# Patient Record
Sex: MALE | Race: WHITE
[De-identification: names, ages, dates, MRNs, and addresses within clinical notes are randomized per-mention and may not be internally consistent; named-entity substitution may affect disease eponyms.]

---

## 2022-08-18 ENCOUNTER — HOSPITAL ENCOUNTER (INPATIENT)
Dept: HOSPITAL 26 - MED | Age: 62
LOS: 29 days | Discharge: HOME HEALTH | DRG: 721 | End: 2022-09-16
Attending: STUDENT IN AN ORGANIZED HEALTH CARE EDUCATION/TRAINING PROGRAM | Admitting: STUDENT IN AN ORGANIZED HEALTH CARE EDUCATION/TRAINING PROGRAM
Payer: MEDICAID

## 2022-08-18 VITALS — SYSTOLIC BLOOD PRESSURE: 118 MMHG | DIASTOLIC BLOOD PRESSURE: 68 MMHG

## 2022-08-18 VITALS — SYSTOLIC BLOOD PRESSURE: 117 MMHG | DIASTOLIC BLOOD PRESSURE: 53 MMHG

## 2022-08-18 VITALS — SYSTOLIC BLOOD PRESSURE: 117 MMHG | DIASTOLIC BLOOD PRESSURE: 55 MMHG

## 2022-08-18 VITALS — SYSTOLIC BLOOD PRESSURE: 107 MMHG | DIASTOLIC BLOOD PRESSURE: 50 MMHG

## 2022-08-18 VITALS — WEIGHT: 135 LBS | BODY MASS INDEX: 18.28 KG/M2 | HEIGHT: 72 IN

## 2022-08-18 VITALS — DIASTOLIC BLOOD PRESSURE: 53 MMHG | SYSTOLIC BLOOD PRESSURE: 117 MMHG

## 2022-08-18 VITALS — SYSTOLIC BLOOD PRESSURE: 118 MMHG | DIASTOLIC BLOOD PRESSURE: 55 MMHG

## 2022-08-18 VITALS — DIASTOLIC BLOOD PRESSURE: 57 MMHG | SYSTOLIC BLOOD PRESSURE: 121 MMHG

## 2022-08-18 VITALS — SYSTOLIC BLOOD PRESSURE: 112 MMHG | DIASTOLIC BLOOD PRESSURE: 57 MMHG

## 2022-08-18 VITALS — SYSTOLIC BLOOD PRESSURE: 117 MMHG | DIASTOLIC BLOOD PRESSURE: 66 MMHG

## 2022-08-18 DIAGNOSIS — I50.9: ICD-10-CM

## 2022-08-18 DIAGNOSIS — H54.8: ICD-10-CM

## 2022-08-18 DIAGNOSIS — L89.629: ICD-10-CM

## 2022-08-18 DIAGNOSIS — N18.6: ICD-10-CM

## 2022-08-18 DIAGNOSIS — E43: ICD-10-CM

## 2022-08-18 DIAGNOSIS — I46.9: ICD-10-CM

## 2022-08-18 DIAGNOSIS — Z99.2: ICD-10-CM

## 2022-08-18 DIAGNOSIS — A41.81: ICD-10-CM
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
## 2022-08-18 DIAGNOSIS — Z20.822: ICD-10-CM

## 2022-08-18 DIAGNOSIS — J69.0: ICD-10-CM

## 2022-08-18 DIAGNOSIS — T80.211A: Primary | ICD-10-CM

## 2022-08-18 DIAGNOSIS — L97.422: ICD-10-CM

## 2022-08-18 DIAGNOSIS — E11.621: ICD-10-CM

## 2022-08-18 DIAGNOSIS — J96.01: ICD-10-CM
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
## 2022-08-18 DIAGNOSIS — E11.22: ICD-10-CM

## 2022-08-18 DIAGNOSIS — R65.20: ICD-10-CM

## 2022-08-18 DIAGNOSIS — I13.2: ICD-10-CM

## 2022-08-18 DIAGNOSIS — J91.8: ICD-10-CM

## 2022-08-18 DIAGNOSIS — D63.1: ICD-10-CM

## 2022-08-18 LAB
ALBUMIN FLD-MCNC: 2.2 G/DL (ref 3.4–5)
AMYLASE SERPL-CCNC: 14 U/L (ref 25–115)
ANION GAP SERPL CALCULATED.3IONS-SCNC: 18.7 MMOL/L (ref 8–16)
APAP SERPL-MCNC: < 0.5 UG/ML (ref 10–30)
APPEARANCE UR: CLEAR
AST SERPL-CCNC: 38 U/L (ref 15–37)
BASOPHILS # BLD AUTO: 0.1 K/UL (ref 0–0.22)
BASOPHILS NFR BLD AUTO: 0.2 % (ref 0–2)
BILIRUB SERPL-MCNC: 3.3 MG/DL (ref 0–1)
BILIRUB UR QL STRIP: (no result)
BUN SERPL-MCNC: 43 MG/DL (ref 7–18)
CHLORIDE SERPL-SCNC: 97 MMOL/L (ref 98–107)
CO2 SERPL-SCNC: 25.7 MMOL/L (ref 21–32)
COLOR UR: (no result)
CREAT SERPL-MCNC: 4.7 MG/DL (ref 0.6–1.3)
EOSINOPHIL # BLD AUTO: 0.1 K/UL (ref 0–0.4)
EOSINOPHIL NFR BLD AUTO: 0.3 % (ref 0–4)
ERYTHROCYTE [DISTWIDTH] IN BLOOD BY AUTOMATED COUNT: 16 % (ref 11.6–13.7)
GFR SERPL CREATININE-BSD FRML MDRD: 16 ML/MIN (ref 90–?)
GLUCOSE SERPL-MCNC: 177 MG/DL (ref 74–106)
GLUCOSE UR STRIP-MCNC: (no result) MG/DL
HCT VFR BLD AUTO: 27 % (ref 36–52)
HGB BLD-MCNC: 9.1 G/DL (ref 12–18)
HGB UR QL STRIP: (no result)
LEUKOCYTE ESTERASE UR QL STRIP: (no result)
LIPASE SERPL-CCNC: 22 U/L (ref 73–393)
LYMPHOCYTES # BLD AUTO: 1 K/UL (ref 2–11.5)
LYMPHOCYTES NFR BLD AUTO: 3.8 % (ref 20.5–51.1)
MAGNESIUM SERPL-MCNC: 2.3 MG/DL (ref 1.8–2.4)
MAGNESIUM SERPL-MCNC: 2.4 MG/DL (ref 1.8–2.4)
MCH RBC QN AUTO: 30 PG (ref 27–31)
MCHC RBC AUTO-ENTMCNC: 34 G/DL (ref 33–37)
MCV RBC AUTO: 89.3 FL (ref 80–94)
MONOCYTES # BLD AUTO: 1.5 K/UL (ref 0.8–1)
MONOCYTES NFR BLD AUTO: 5.7 % (ref 1.7–9.3)
NEUTROPHILS # BLD AUTO: 23.3 K/UL (ref 1.8–7.7)
NEUTROPHILS NFR BLD AUTO: 90 % (ref 42.2–75.2)
NITRITE UR QL STRIP: POSITIVE
PH UR STRIP: 5 [PH] (ref 5–9)
PHOSPHATE SERPL-MCNC: 3.6 MG/DL (ref 2.5–4.9)
PHOSPHATE SERPL-MCNC: 3.6 MG/DL (ref 2.5–4.9)
PLATELET # BLD AUTO: 232 K/UL (ref 140–450)
POTASSIUM SERPL-SCNC: 4.4 MMOL/L (ref 3.5–5.1)
PROTHROMBIN TIME: 12.4 SECS (ref 10.8–13.4)
RBC # BLD AUTO: 3.03 MIL/UL (ref 4.2–6.1)
RBC #/AREA URNS HPF: (no result) /HPF (ref 0–5)
SALICYLATES SERPL-MCNC: < 2.8 MG/DL (ref 2.8–20)
SODIUM SERPL-SCNC: 137 MMOL/L (ref 136–145)
WBC # BLD AUTO: 25.9 K/UL (ref 4.8–10.8)
WBC,URINE: (no result) /HPF (ref 0–5)

## 2022-08-18 PROCEDURE — G0480 DRUG TEST DEF 1-7 CLASSES: HCPCS

## 2022-08-18 PROCEDURE — G0482 DRUG TEST DEF 15-21 CLASSES: HCPCS

## 2022-08-18 PROCEDURE — A4649 SURGICAL SUPPLIES: HCPCS

## 2022-08-18 PROCEDURE — C9113 INJ PANTOPRAZOLE SODIUM, VIA: HCPCS

## 2022-08-18 RX ADMIN — Medication SCH DEV: at 21:29

## 2022-08-18 RX ADMIN — IPRATROPIUM BROMIDE AND ALBUTEROL SULFATE PRN ML: .5; 3 SOLUTION RESPIRATORY (INHALATION) at 23:06

## 2022-08-18 RX ADMIN — IPRATROPIUM BROMIDE AND ALBUTEROL SULFATE SCH ML: .5; 3 SOLUTION RESPIRATORY (INHALATION) at 20:29

## 2022-08-18 NOTE — NUR
FAMILY AT BEDSIDE, ALL QUESTIONS ANSWERED

DTMADINA COTTO: [phone] (CELL)

WIFE: MICHELLE DIAS, [phone] (CELL)

## 2022-08-18 NOTE — NUR
Patient will be admitted to care of DR. SARKAR. Admited to ICU.  Will go to BED 
3. Belongings list completed.  Report to JUNO.

## 2022-08-18 NOTE — NUR
RECEIVED CALL FROM LAB; MD ORDERED COVID - PCR TEST.

DUE TO NEW COVID TEST BEING REQUESTED FOR PATIENT; HE WAS TRANSFERRED TO ICU-8 AND IS LISTED 
AS PUI UNTIL RESULTS COME BACK

PATIENT HAD NEGATIVE COVID (RAPID TEST) IN ER.

**

SWABBED PATIENT FOR UPDATED COVID, AND SPECIMEN WAS TAKEN TO THE LAB FOR PICK-UP AND 
TRANSPORT TO OUTSIDE LAB.

## 2022-08-18 NOTE — NUR
RECEIVED REPORT FROM ER NURSE (JAKOB).  PATIENT ARRIVED ON GURNEY, INTUBATED (ETT TO VENT) 
ON PROPOFOL DRIP AT 25MCG/MIN.  PATIENT ALSO WITH OG TUBE.  PATIENT HAS BARAJAS IN PLACE.

PATIENT WITH PICC LINE DYLAN, EDUARD CATH RIGHT UPPER CHEST WALL, AND PERIPHERAL IV, 18G, 
LEFT AC.  

HAS SACRAL PRESSURE ULCER, AND LEFT HEEL ULCER.  

CURRENT VENT SETTINGS:  AC/VC, FIO2 = 80%, VT = 500, R = 16, PEEP = 5.

## 2022-08-18 NOTE — NUR
61Y.O. M BIBA C/O SOB/AGANAL BREATHING. PT BEING BAGGED ON ARRIVAL 15L. GCS:3. 
PER FAMILY PT WAS TESTED FOR COVID; UNKNOWN RESULT. PT HAS DIALYSIS MWF. PT 
UNRESPONSIVE ON ARRIVAL. SKIN WAS COOL PALE AND STICKY. PICC R UPPER ARM. BS: 
186 PER EMS. A&OX0, SKIN HAS BRUISING AND PRESSURE INJURIES, VITALS STABLE AT 
THIS TIME, AND ON BED REST. 



NKA

HX: HTN, DM, RENAL FAILURE (ERSD)

## 2022-08-18 NOTE — NUR
(1540) ON OR ABOUT THIS TIME PATIENT INTUBATED BY DR. CLAIRE VALIENTE; USING A 
GLIDESCOPE AN ENDOTRACHEAL TUBE #8.0;  SUCCESSFUL INTUBATION; CONFIRMATION VIA 
CO2 DETECTOR (YELLOW); AUSCULTATION BY BLANCAD TO THE BILATERAL LUNG KAY APEX 
TO MID THE ABDOMINAL REGION; CHEST XRAY TO FOLLOW

## 2022-08-19 VITALS — DIASTOLIC BLOOD PRESSURE: 52 MMHG | SYSTOLIC BLOOD PRESSURE: 111 MMHG

## 2022-08-19 VITALS — SYSTOLIC BLOOD PRESSURE: 116 MMHG | DIASTOLIC BLOOD PRESSURE: 53 MMHG

## 2022-08-19 VITALS — DIASTOLIC BLOOD PRESSURE: 61 MMHG | SYSTOLIC BLOOD PRESSURE: 111 MMHG

## 2022-08-19 VITALS — SYSTOLIC BLOOD PRESSURE: 111 MMHG | DIASTOLIC BLOOD PRESSURE: 54 MMHG

## 2022-08-19 VITALS — SYSTOLIC BLOOD PRESSURE: 113 MMHG | DIASTOLIC BLOOD PRESSURE: 54 MMHG

## 2022-08-19 VITALS — DIASTOLIC BLOOD PRESSURE: 50 MMHG | SYSTOLIC BLOOD PRESSURE: 112 MMHG

## 2022-08-19 VITALS — SYSTOLIC BLOOD PRESSURE: 108 MMHG | DIASTOLIC BLOOD PRESSURE: 52 MMHG

## 2022-08-19 VITALS — DIASTOLIC BLOOD PRESSURE: 51 MMHG | SYSTOLIC BLOOD PRESSURE: 109 MMHG

## 2022-08-19 VITALS — SYSTOLIC BLOOD PRESSURE: 113 MMHG | DIASTOLIC BLOOD PRESSURE: 53 MMHG

## 2022-08-19 VITALS — SYSTOLIC BLOOD PRESSURE: 133 MMHG | DIASTOLIC BLOOD PRESSURE: 61 MMHG

## 2022-08-19 VITALS — SYSTOLIC BLOOD PRESSURE: 113 MMHG | DIASTOLIC BLOOD PRESSURE: 55 MMHG

## 2022-08-19 VITALS — DIASTOLIC BLOOD PRESSURE: 58 MMHG | SYSTOLIC BLOOD PRESSURE: 115 MMHG

## 2022-08-19 VITALS — SYSTOLIC BLOOD PRESSURE: 118 MMHG | DIASTOLIC BLOOD PRESSURE: 54 MMHG

## 2022-08-19 VITALS — SYSTOLIC BLOOD PRESSURE: 111 MMHG | DIASTOLIC BLOOD PRESSURE: 52 MMHG

## 2022-08-19 VITALS — SYSTOLIC BLOOD PRESSURE: 95 MMHG | DIASTOLIC BLOOD PRESSURE: 49 MMHG

## 2022-08-19 VITALS — DIASTOLIC BLOOD PRESSURE: 54 MMHG | SYSTOLIC BLOOD PRESSURE: 119 MMHG

## 2022-08-19 VITALS — SYSTOLIC BLOOD PRESSURE: 100 MMHG | DIASTOLIC BLOOD PRESSURE: 52 MMHG

## 2022-08-19 VITALS — SYSTOLIC BLOOD PRESSURE: 113 MMHG | DIASTOLIC BLOOD PRESSURE: 50 MMHG

## 2022-08-19 VITALS — DIASTOLIC BLOOD PRESSURE: 56 MMHG | SYSTOLIC BLOOD PRESSURE: 113 MMHG

## 2022-08-19 VITALS — DIASTOLIC BLOOD PRESSURE: 51 MMHG | SYSTOLIC BLOOD PRESSURE: 106 MMHG

## 2022-08-19 VITALS — DIASTOLIC BLOOD PRESSURE: 52 MMHG | SYSTOLIC BLOOD PRESSURE: 101 MMHG

## 2022-08-19 VITALS — DIASTOLIC BLOOD PRESSURE: 54 MMHG | SYSTOLIC BLOOD PRESSURE: 112 MMHG

## 2022-08-19 VITALS — SYSTOLIC BLOOD PRESSURE: 106 MMHG | DIASTOLIC BLOOD PRESSURE: 52 MMHG

## 2022-08-19 VITALS — DIASTOLIC BLOOD PRESSURE: 53 MMHG | SYSTOLIC BLOOD PRESSURE: 108 MMHG

## 2022-08-19 VITALS — SYSTOLIC BLOOD PRESSURE: 114 MMHG | DIASTOLIC BLOOD PRESSURE: 53 MMHG

## 2022-08-19 VITALS — DIASTOLIC BLOOD PRESSURE: 50 MMHG | SYSTOLIC BLOOD PRESSURE: 102 MMHG

## 2022-08-19 VITALS — DIASTOLIC BLOOD PRESSURE: 50 MMHG | SYSTOLIC BLOOD PRESSURE: 130 MMHG

## 2022-08-19 LAB
ANION GAP SERPL CALCULATED.3IONS-SCNC: 18.3 MMOL/L (ref 8–16)
BASOPHILS # BLD AUTO: 0.1 K/UL (ref 0–0.22)
BASOPHILS NFR BLD AUTO: 0.2 % (ref 0–2)
BUN SERPL-MCNC: 51 MG/DL (ref 7–18)
CHLORIDE SERPL-SCNC: 97 MMOL/L (ref 98–107)
CO2 SERPL-SCNC: 24.7 MMOL/L (ref 21–32)
CREAT SERPL-MCNC: 5.2 MG/DL (ref 0.6–1.3)
EOSINOPHIL # BLD AUTO: 0.1 K/UL (ref 0–0.4)
EOSINOPHIL NFR BLD AUTO: 0.4 % (ref 0–4)
ERYTHROCYTE [DISTWIDTH] IN BLOOD BY AUTOMATED COUNT: 16 % (ref 11.6–13.7)
GFR SERPL CREATININE-BSD FRML MDRD: 15 ML/MIN (ref 90–?)
GLUCOSE SERPL-MCNC: 158 MG/DL (ref 74–106)
HCT VFR BLD AUTO: 29.5 % (ref 36–52)
HGB BLD-MCNC: 9.8 G/DL (ref 12–18)
LYMPHOCYTES # BLD AUTO: 1.1 K/UL (ref 2–11.5)
LYMPHOCYTES NFR BLD AUTO: 3.2 % (ref 20.5–51.1)
MCH RBC QN AUTO: 30 PG (ref 27–31)
MCHC RBC AUTO-ENTMCNC: 33 G/DL (ref 33–37)
MCV RBC AUTO: 89.5 FL (ref 80–94)
MONOCYTES # BLD AUTO: 1.5 K/UL (ref 0.8–1)
MONOCYTES NFR BLD AUTO: 4.4 % (ref 1.7–9.3)
NEUTROPHILS # BLD AUTO: 30.9 K/UL (ref 1.8–7.7)
NEUTROPHILS NFR BLD AUTO: 91.8 % (ref 42.2–75.2)
PLATELET # BLD AUTO: 254 K/UL (ref 140–450)
POTASSIUM SERPL-SCNC: 4 MMOL/L (ref 3.5–5.1)
RBC # BLD AUTO: 3.3 MIL/UL (ref 4.2–6.1)
SODIUM SERPL-SCNC: 136 MMOL/L (ref 136–145)
WBC # BLD AUTO: 33.7 K/UL (ref 4.8–10.8)

## 2022-08-19 PROCEDURE — 5A1D70Z PERFORMANCE OF URINARY FILTRATION, INTERMITTENT, LESS THAN 6 HOURS PER DAY: ICD-10-PCS

## 2022-08-19 RX ADMIN — Medication SCH TAB: at 08:29

## 2022-08-19 RX ADMIN — DEXTROSE SCH MLS/HR: 50 INJECTION, SOLUTION INTRAVENOUS at 09:52

## 2022-08-19 RX ADMIN — IPRATROPIUM BROMIDE AND ALBUTEROL SULFATE SCH ML: .5; 3 SOLUTION RESPIRATORY (INHALATION) at 19:00

## 2022-08-19 RX ADMIN — Medication SCH GM: at 13:00

## 2022-08-19 RX ADMIN — Medication SCH DEV: at 12:03

## 2022-08-19 RX ADMIN — Medication SCH DEV: at 07:51

## 2022-08-19 RX ADMIN — Medication SCH DEV: at 21:12

## 2022-08-19 RX ADMIN — PANTOPRAZOLE SODIUM SCH MG: 40 TABLET, DELAYED RELEASE ORAL at 08:29

## 2022-08-19 RX ADMIN — IPRATROPIUM BROMIDE AND ALBUTEROL SULFATE SCH ML: .5; 3 SOLUTION RESPIRATORY (INHALATION) at 13:13

## 2022-08-19 RX ADMIN — Medication SCH DEV: at 07:30

## 2022-08-19 RX ADMIN — IPRATROPIUM BROMIDE AND ALBUTEROL SULFATE SCH ML: .5; 3 SOLUTION RESPIRATORY (INHALATION) at 07:21

## 2022-08-19 RX ADMIN — DEXTROSE SCH MLS/HR: 50 INJECTION, SOLUTION INTRAVENOUS at 02:24

## 2022-08-19 RX ADMIN — PROPOFOL PRN MLS/HR: 10 INJECTION, EMULSION INTRAVENOUS at 21:00

## 2022-08-19 RX ADMIN — Medication SCH DEV: at 16:47

## 2022-08-19 RX ADMIN — DEXTROSE SCH MLS/HR: 50 INJECTION, SOLUTION INTRAVENOUS at 17:55

## 2022-08-19 RX ADMIN — Medication SCH MG: at 08:30

## 2022-08-19 RX ADMIN — EPOETIN ALFA-EPBX SCH UNITS: 10000 INJECTION, SOLUTION INTRAVENOUS; SUBCUTANEOUS at 14:57

## 2022-08-19 NOTE — NUR
RECEIVED REPORT FROM BABITA. PT IN STATED CONDITION PT WAS INTUBATED IN THE ED . HE'S 
SEDATED PRESENTLY ON PROPOFOL @25MCG/KG.HE IS AT A RASS -4 PROPOFOL IS FINISHED.IT WAS A ONE 
TIME ORDER AND HAD TO BE REORDERED BY JAD LANE NURSE. HE HAS A DYLAN PICC LINE  AND A LAC 18 
GUAGE. HE HAS NS INFUSING AT A KVO RATE.  HE IS TOLERATING THE VENT WELL. HE IS IN SOFT 
WRIST RESTRAINTS. HE HAS A BARAJAS CATH IN PLACE WITH NO OUTPUT. HE HAD DIALYSIS ON 08/18/22 
AND CAME TO THE ED S/P DIALYSIS DUE TO AGONAL BREATHING. HE WAS ENTUBATED INN THE ED TO 
PROTECT HIS AIRWAY.

## 2022-08-19 NOTE — NUR
WOUND CARE EVALUATION NOTE:

SKIN ASSESSMENT DONE WITH THIS PT ADMITTED WITH INITIAL DX SOB.  PAST MEDICAL HX INCLUDES 
ESRD on HD (MWF), DM2 AND HTN. PT ADMITTED WITH PRESSURE INJURY STAGE 3 TO SACRAL AND 
UN-STAGEABLE TO LEFT HEEL. ALL ABOVE INFORMATION OBTAINED FROM ADMISSION H&P. PT IS 
INTUBATED, TF, SKIN IS WARM AND DRY, BLE NO HAIR GROWTH, +1 EDEMA.  DORSAL PEDAL PULSES 
PRESENT AND NORMAL. CAPILLARY REFILLED <2 SEC. X 10 TOES.  F/C PATENT WITH SMALL AMOUNT 
DANO COLOR URINE OUT PUT OBSERVED. PLAN OF CARE DISCUSSED WITH PRIMARY RN. POC DISCUSSED 
WITH DR. SARKAR AND REQUEST IN HOUSE PODIATRY CONSULT.



INTEGUMENTARY:

-INCONTINENT ASSOCIATE DERMATITIS (IAD) TO: B/L GROINS, INNER THIGH AND POSTERIOR THIGH TO 
SCROTAL, SKIN REDNESS, PEELING

-PRESSURE INJURY STAGE 3, SACROCOCCYX 5X2X0.3CM, BED IS % GRANULATING TISSUE, MOIST, 
NO ODOR, SERENA-WOUND SKIN MOIST, HEALING SCAR TISSUE, SURROUNDING NON-BLANCHABLE REDNESS 
INDICATED FURTHER DAMAGE

- PRESSURE INJURY UN-STAGEABLE, LEFT HEEL 5X4CM WOUND BED IS % BROWN/BLACK SLOUGH 
TISSUE, SMALL AMOUNT PURULRNT DRAINAGE, MILD ODOR, ENTIRE SERENA WOUND MUSHY, DENUDED SKIN 
WITH SURROUNDING DTI [id number] INDICATED FURTHER DAMAGE



RECOMMENDATIONS:

-WOUND CX TO SACRAL AND LEFT HEEL

-APPLY HEEL RAISERS AND OFFLOAD HEELS WITH PILLOWS

-LEFT HEEL APPLY MOIST BETADINE 5H6FWSHG, COVER WITH DRY DRESSING AND WRAP WITH KERLIX ROLLS 
DAILY AND PRN IF SOILING

-CLEANSE SACRAL WOUND WITH NS, PAT DRY, APPLY THERAHONEY GEL TO WOUND BED AND COVER WITH DRY 
DRESSING DAILY AND PRN IF SOILING.

-POSITIONING:

TURN AND REPOSITION PATIENT Q 2H OR SOONER

USE PILLOWS TO KEEP BONY PROMINENCES FROM DIRECT CONTACT WITH SURFACES

USE REPOSITIONING WEDGES TO PROVIDE 30-DEGREE ANGLE FOR SIDE LYING POSITIONS

OFFLOADING OR FOAM DRESSING TO ALL TUBING TO PREVENT MEDICAL DEVICES RELATED PRESSURE INJURY

-RE-EVALUATING AND MANAGING INCONTINENCE

MONITOR SKIN CONDITION DURING POSITION CHANGE

DO NOT MASSAGE REDNESS, BONY PROMINENCES

FREQUENT SERENA-CARE AND PROVIDE BARRIER CREAMS PRN IF SOILING

MOISTURE CONTROL BY OFFER BED PAN/URINAL /ABSORBENT PAD TO WICK AND HOLD MOISTURE

KEEP SKIN DRY AND PROTECT FROM FRICTION

-MANAGE FRICTION/SHEAR/MOBILITY

KEEP HOB AT THE LOWEST LEVEL OF ELEVATION NO MORE THAN 30 DEGREE UNLESS OTHERWISE 
CONTRAINDICATED

USE LIFT SHEET OR TRANSFER DEVICE TO MOVE PATIENT AND PREVENT LATERAL SHEER.

PROTECT HEELS, ELBOWS BONY PROMINENCES WITH SKIN BERRIES OR FOAM DRESSING IF EXPOSED TO 
FRICTION

OFFLOAD BILATERAL HEELS BY PLACING PILLOWS UNDER CALVES AT ALL TIMES, UNLESS OTHERWISE 
CONTRAINDICATED

-PRESSURE REDISTRIBUTION SURFACE THERAPY NOHELIA ISOFLEX MATTRESS

-NUTRITION:

PLEASE FOLLOW RD RECOMMENDATIONS AND OFFER NUTRITION SUPPLEMENTS IF ORDERED.

## 2022-08-19 NOTE — NUR
RECEIVED REPORT FROM TONYA NIGHT SHIFT RN, FOR CONTINUITY OF CARE. PT A&OX0, SEDATED. 
PUPILS SLUGGISH RESPONSE TO LIGHT. ETT TO VENT, ACVC FIO2 50%, , PEEP 5. SR ON 
MONITOR. PICC TO DYLAN INFUSING PROPOFOL AT 25 MCG/KG/MIN. 18G IV TO LAC, SALINE LOCK. OGT IN 
PLACE, CLAMPED. BOWEL SOUNDS ACTIVE. F/C TO GRAVITY, OLIGURIA. GENERALIZED WEAKNESS. SOFT 
WRIST RESTRAINTS TO BUE NO S/S OF INJURY. DROPLET PRECAUTION FOR COVID PUI. BED IN LOWEST 
POSITION, CALL LIGHT WITHIN REACH.

## 2022-08-19 NOTE — NUR
PATIENT HAS BEEN SCREENED AND CATEGORIZED AS HIGH NUTRITION RISK. PATIENT WILL BE SEEN 
WITHIN 1-2 DAYS OF ADMISSION.



8/19/228/20/22

REFERRAL RECEIVED FOR WOUNDS/PRESSURE INJURY AND MALNUTRITION



DAMIR HART RD

## 2022-08-19 NOTE — NUR
SEEN AND EXAMINED BY DR. MAURER FOR L FOOT ULCER. ORDERED TO CLEANSE WITH IODINE, WRAP WITH 
GAUZE, AND ELEVATE HEEL ON PILLOW TO OFFLOAD PRESSURE.

## 2022-08-19 NOTE — NUR
DAUGHTER MADINA VISIT OUTSIDE OF PT. RM. PT. WOUND HX OBTAINED FROM DAUGHTER, PER DAUGHTER 
LEFT HEEL HX OF DEBRIDEMENT AT 

[facility] BY DR. MARIXA BAEZA AND PT. HAS JUST FINISHED A COURSE OF IV 
ANTIBIOTIC RECENTLY, PER DAUGHTER SHE PROVIDES WOUND CARE AT HOME BUT NO SANTYLE OINTMENT 
AVAILABLE DUE TO PRICING AND LEFT HEEL /SACRAL WOUNDS DOES"T REALLY HEAL WELL. TREATMENT 
PLAN DISCUSSED AND AGREEABLE WITH BEATRIZ RAINEY. POC DISCUSSED WITH DR. SARKAR, ALL ABOVE 
INFORMATION DISCUSSED  AND RECOMMEND PODIATRY CONSULT. POC DISCUSSED WITH PRIMARY RN 
BABITA.

## 2022-08-19 NOTE — NUR
8/19/22 RD INITIAL ASSESSMENT COMPLETED



PLEASE REFER TO NUTRITION ASSESSMENT UNDER CARE ACTIVITY FOR ESTIMATED NUTRITIONAL NEEDS. 



1. WHEN/IF MEDICALLY APPROPRIATE TO INITIATE TF, RECOMMEND NEPRO CARBSTEADY WITH A GOAL RATE 
OF 50 ML/HR WITH PROSOURCE TID 

-FWF: 50 ML Q8H OR PER MD

-START AT 20 ML/HR AND INCREASE BY 10 ML Q4H AS TOLERATED  

-WITH PROSOURCE TID, PT WILL RECEIVE ~94% ESTIMATED KCAL % ESTIMATED PROTEIN NEEDS; 
ADEQUATE

2. IF EXTUBATED, RECOMMEND RENAL DIET WITH NEPRO BID 

3. RD TO FOLLOW-UP 2-3 DAYS, HIGH RISK 



DAMIR HART RD

## 2022-08-19 NOTE — NUR
RECEIVED CALL FROM DR. MERCADO; PROVIDED UPDATE ON PATIENT'S CURRENT STATUS; NO NEW ORDERS 
PROVIDED

## 2022-08-19 NOTE — NUR
Last visit 7/18. Next visit 2/19. Last thyroid labs 7/5/18.    ASSESSMENT:  1. Postsurgical hypothyroidism: Patient is on levothyroxine 224 mcg daily Monday through Saturday and 168 micrograms on Sunday. most recent labs are normal. Patient is on levothyroxine 112 micrograms tablets and takes 2 tablets Monday through Saturday and one and a half tablet on Sunday.    2. Other malaise and fatigue: Likely multifactorial.   3. S/P thyroidectomy: Pathology did not show any evidence of thyroid malignancy.    4.  Vitamin D deficiency likely nutritional in nature: Patient is not currently on vitamin D3 1000 IU daily.  5.  Perimenopausal: menstrual cycles are irregular.     PLAN:  1. I will check a TSH, free T4 and vitamin D in 6 months.  2. I will continue current levothyroxine dose with no change.         XRAY AT BEDSIDE FOR FOOT XRAY.

## 2022-08-19 NOTE — NUR
RECEIVED PT ON ACVC , RR16, +5, 50%. WHEELS LOCKED AND PLUGGED INTO RED OUTLET, ALARMS 
ARE SET AND AUDIBLE. PT RESTING COMFORTABLY.

## 2022-08-20 VITALS — SYSTOLIC BLOOD PRESSURE: 115 MMHG | DIASTOLIC BLOOD PRESSURE: 58 MMHG

## 2022-08-20 VITALS — SYSTOLIC BLOOD PRESSURE: 123 MMHG | DIASTOLIC BLOOD PRESSURE: 60 MMHG

## 2022-08-20 VITALS — DIASTOLIC BLOOD PRESSURE: 57 MMHG | SYSTOLIC BLOOD PRESSURE: 115 MMHG

## 2022-08-20 VITALS — DIASTOLIC BLOOD PRESSURE: 85 MMHG | SYSTOLIC BLOOD PRESSURE: 123 MMHG

## 2022-08-20 VITALS — SYSTOLIC BLOOD PRESSURE: 116 MMHG | DIASTOLIC BLOOD PRESSURE: 59 MMHG

## 2022-08-20 VITALS — DIASTOLIC BLOOD PRESSURE: 56 MMHG | SYSTOLIC BLOOD PRESSURE: 116 MMHG

## 2022-08-20 VITALS — SYSTOLIC BLOOD PRESSURE: 119 MMHG | DIASTOLIC BLOOD PRESSURE: 61 MMHG

## 2022-08-20 VITALS — DIASTOLIC BLOOD PRESSURE: 52 MMHG | SYSTOLIC BLOOD PRESSURE: 105 MMHG

## 2022-08-20 VITALS — SYSTOLIC BLOOD PRESSURE: 116 MMHG | DIASTOLIC BLOOD PRESSURE: 61 MMHG

## 2022-08-20 VITALS — SYSTOLIC BLOOD PRESSURE: 110 MMHG | DIASTOLIC BLOOD PRESSURE: 57 MMHG

## 2022-08-20 VITALS — SYSTOLIC BLOOD PRESSURE: 113 MMHG | DIASTOLIC BLOOD PRESSURE: 58 MMHG

## 2022-08-20 VITALS — SYSTOLIC BLOOD PRESSURE: 126 MMHG | DIASTOLIC BLOOD PRESSURE: 60 MMHG

## 2022-08-20 VITALS — DIASTOLIC BLOOD PRESSURE: 61 MMHG | SYSTOLIC BLOOD PRESSURE: 118 MMHG

## 2022-08-20 VITALS — SYSTOLIC BLOOD PRESSURE: 117 MMHG | DIASTOLIC BLOOD PRESSURE: 59 MMHG

## 2022-08-20 VITALS — DIASTOLIC BLOOD PRESSURE: 59 MMHG | SYSTOLIC BLOOD PRESSURE: 123 MMHG

## 2022-08-20 VITALS — SYSTOLIC BLOOD PRESSURE: 114 MMHG | DIASTOLIC BLOOD PRESSURE: 51 MMHG

## 2022-08-20 VITALS — DIASTOLIC BLOOD PRESSURE: 50 MMHG | SYSTOLIC BLOOD PRESSURE: 104 MMHG

## 2022-08-20 VITALS — DIASTOLIC BLOOD PRESSURE: 63 MMHG | SYSTOLIC BLOOD PRESSURE: 126 MMHG

## 2022-08-20 VITALS — SYSTOLIC BLOOD PRESSURE: 130 MMHG | DIASTOLIC BLOOD PRESSURE: 56 MMHG

## 2022-08-20 VITALS — SYSTOLIC BLOOD PRESSURE: 113 MMHG | DIASTOLIC BLOOD PRESSURE: 57 MMHG

## 2022-08-20 VITALS — SYSTOLIC BLOOD PRESSURE: 110 MMHG | DIASTOLIC BLOOD PRESSURE: 62 MMHG

## 2022-08-20 VITALS — DIASTOLIC BLOOD PRESSURE: 61 MMHG | SYSTOLIC BLOOD PRESSURE: 120 MMHG

## 2022-08-20 VITALS — SYSTOLIC BLOOD PRESSURE: 125 MMHG | DIASTOLIC BLOOD PRESSURE: 60 MMHG

## 2022-08-20 VITALS — DIASTOLIC BLOOD PRESSURE: 56 MMHG | SYSTOLIC BLOOD PRESSURE: 104 MMHG

## 2022-08-20 VITALS — SYSTOLIC BLOOD PRESSURE: 129 MMHG | DIASTOLIC BLOOD PRESSURE: 60 MMHG

## 2022-08-20 VITALS — SYSTOLIC BLOOD PRESSURE: 107 MMHG | DIASTOLIC BLOOD PRESSURE: 47 MMHG

## 2022-08-20 VITALS — DIASTOLIC BLOOD PRESSURE: 59 MMHG | SYSTOLIC BLOOD PRESSURE: 118 MMHG

## 2022-08-20 VITALS — SYSTOLIC BLOOD PRESSURE: 117 MMHG | DIASTOLIC BLOOD PRESSURE: 58 MMHG

## 2022-08-20 VITALS — DIASTOLIC BLOOD PRESSURE: 50 MMHG | SYSTOLIC BLOOD PRESSURE: 111 MMHG

## 2022-08-20 VITALS — DIASTOLIC BLOOD PRESSURE: 51 MMHG | SYSTOLIC BLOOD PRESSURE: 113 MMHG

## 2022-08-20 VITALS — SYSTOLIC BLOOD PRESSURE: 123 MMHG | DIASTOLIC BLOOD PRESSURE: 59 MMHG

## 2022-08-20 VITALS — DIASTOLIC BLOOD PRESSURE: 87 MMHG | SYSTOLIC BLOOD PRESSURE: 110 MMHG

## 2022-08-20 LAB
ALBUMIN FLD-MCNC: 1.9 G/DL (ref 3.4–5)
ANION GAP SERPL CALCULATED.3IONS-SCNC: 15.8 MMOL/L (ref 8–16)
ANION GAP SERPL CALCULATED.3IONS-SCNC: 17.7 MMOL/L (ref 8–16)
AST SERPL-CCNC: 24 U/L (ref 15–37)
BASOPHILS # BLD AUTO: 0.1 K/UL (ref 0–0.22)
BASOPHILS NFR BLD AUTO: 0.2 % (ref 0–2)
BILIRUB DIRECT SERPL-MCNC: 1.7 MG/DL (ref 0–0.3)
BILIRUB SERPL-MCNC: 2.5 MG/DL (ref 0–1)
BUN SERPL-MCNC: 31 MG/DL (ref 7–18)
BUN SERPL-MCNC: 34 MG/DL (ref 7–18)
CHLORIDE SERPL-SCNC: 100 MMOL/L (ref 98–107)
CHLORIDE SERPL-SCNC: 99 MMOL/L (ref 98–107)
CO2 SERPL-SCNC: 23.5 MMOL/L (ref 21–32)
CO2 SERPL-SCNC: 24.2 MMOL/L (ref 21–32)
CREAT SERPL-MCNC: 3.5 MG/DL (ref 0.6–1.3)
CREAT SERPL-MCNC: 3.8 MG/DL (ref 0.6–1.3)
EOSINOPHIL # BLD AUTO: 0.3 K/UL (ref 0–0.4)
EOSINOPHIL NFR BLD AUTO: 1.3 % (ref 0–4)
EOSINOPHIL NFR BLD MANUAL: 1 % (ref 0–4)
ERYTHROCYTE [DISTWIDTH] IN BLOOD BY AUTOMATED COUNT: 15.7 % (ref 11.6–13.7)
ERYTHROCYTE [DISTWIDTH] IN BLOOD BY AUTOMATED COUNT: 16 % (ref 11.6–13.7)
GFR SERPL CREATININE-BSD FRML MDRD: 21 ML/MIN (ref 90–?)
GFR SERPL CREATININE-BSD FRML MDRD: 23 ML/MIN (ref 90–?)
GLUCOSE SERPL-MCNC: 109 MG/DL (ref 74–106)
GLUCOSE SERPL-MCNC: 142 MG/DL (ref 74–106)
HCT VFR BLD AUTO: 29 % (ref 36–52)
HCT VFR BLD AUTO: 30.1 % (ref 36–52)
HGB BLD-MCNC: 9.7 G/DL (ref 12–18)
HGB BLD-MCNC: 9.9 G/DL (ref 12–18)
LYMPHOCYTES # BLD AUTO: 0.7 K/UL (ref 2–11.5)
LYMPHOCYTES NFR BLD AUTO: 2.8 % (ref 20.5–51.1)
LYMPHOCYTES NFR BLD MANUAL: 5 % (ref 20–46)
MCH RBC QN AUTO: 30 PG (ref 27–31)
MCH RBC QN AUTO: 30 PG (ref 27–31)
MCHC RBC AUTO-ENTMCNC: 33 G/DL (ref 33–37)
MCHC RBC AUTO-ENTMCNC: 33 G/DL (ref 33–37)
MCV RBC AUTO: 88.8 FL (ref 80–94)
MCV RBC AUTO: 90 FL (ref 80–94)
MONOCYTES # BLD AUTO: 1.4 K/UL (ref 0.8–1)
MONOCYTES NFR BLD AUTO: 5.4 % (ref 1.7–9.3)
MONOCYTES NFR BLD MANUAL: 2 % (ref 5–12)
NEUTROPHILS # BLD AUTO: 23.7 K/UL (ref 1.8–7.7)
NEUTROPHILS NFR BLD AUTO: 90.3 % (ref 42.2–75.2)
PLATELET # BLD AUTO: 276 K/UL (ref 140–450)
PLATELET # BLD AUTO: 287 K/UL (ref 140–450)
POTASSIUM SERPL-SCNC: 4 MMOL/L (ref 3.5–5.1)
POTASSIUM SERPL-SCNC: 4.2 MMOL/L (ref 3.5–5.1)
RBC # BLD AUTO: 3.27 MIL/UL (ref 4.2–6.1)
RBC # BLD AUTO: 3.35 MIL/UL (ref 4.2–6.1)
SODIUM SERPL-SCNC: 136 MMOL/L (ref 136–145)
SODIUM SERPL-SCNC: 136 MMOL/L (ref 136–145)
WBC # BLD AUTO: 25.8 K/UL (ref 4.8–10.8)
WBC # BLD AUTO: 26.3 K/UL (ref 4.8–10.8)

## 2022-08-20 PROCEDURE — 0BH17EZ INSERTION OF ENDOTRACHEAL AIRWAY INTO TRACHEA, VIA NATURAL OR ARTIFICIAL OPENING: ICD-10-PCS

## 2022-08-20 PROCEDURE — 5A1945Z RESPIRATORY VENTILATION, 24-96 CONSECUTIVE HOURS: ICD-10-PCS

## 2022-08-20 PROCEDURE — 5A12012 PERFORMANCE OF CARDIAC OUTPUT, SINGLE, MANUAL: ICD-10-PCS

## 2022-08-20 RX ADMIN — Medication SCH DEV: at 16:30

## 2022-08-20 RX ADMIN — DEXTROSE SCH MLS/HR: 50 INJECTION, SOLUTION INTRAVENOUS at 09:12

## 2022-08-20 RX ADMIN — Medication SCH TAB: at 08:12

## 2022-08-20 RX ADMIN — Medication SCH MG: at 08:12

## 2022-08-20 RX ADMIN — Medication SCH GM: at 13:00

## 2022-08-20 RX ADMIN — IPRATROPIUM BROMIDE AND ALBUTEROL SULFATE SCH ML: .5; 3 SOLUTION RESPIRATORY (INHALATION) at 13:10

## 2022-08-20 RX ADMIN — IPRATROPIUM BROMIDE AND ALBUTEROL SULFATE SCH ML: .5; 3 SOLUTION RESPIRATORY (INHALATION) at 08:45

## 2022-08-20 RX ADMIN — DEXTROSE SCH MLS/HR: 50 INJECTION, SOLUTION INTRAVENOUS at 18:27

## 2022-08-20 RX ADMIN — Medication SCH DEV: at 21:29

## 2022-08-20 RX ADMIN — DEXTROSE SCH MLS/HR: 50 INJECTION, SOLUTION INTRAVENOUS at 02:00

## 2022-08-20 RX ADMIN — PROPOFOL PRN MLS/HR: 10 INJECTION, EMULSION INTRAVENOUS at 18:30

## 2022-08-20 RX ADMIN — Medication SCH DEV: at 12:29

## 2022-08-20 RX ADMIN — IPRATROPIUM BROMIDE AND ALBUTEROL SULFATE SCH ML: .5; 3 SOLUTION RESPIRATORY (INHALATION) at 19:18

## 2022-08-20 RX ADMIN — PANTOPRAZOLE SODIUM SCH MG: 40 TABLET, DELAYED RELEASE ORAL at 08:12

## 2022-08-20 RX ADMIN — Medication SCH DEV: at 08:02

## 2022-08-20 NOTE — NUR
PT IS ABLE TO FOLLOW COMMANDS WHEN SPOKEN TO IN Cuban ONLY. DOES NOT OPEN EYES D/T 
BLINDNESS SECONDARY TO DM2 AS STATED BY DAUGHTER MADINA. PT ABLE TO RESPOND APPROPRIATELY TO 
YES OR NO QUESTIONS IN Cuban. SQUEEZES HAND WHEN ASKED.

## 2022-08-20 NOTE — NUR
PT HAD AN EPISODE,HIS R\HEART RATE DROPPED TO 32. AND THE PT HAD A BLUISH HEW ABOUT HIS FACE 
. HE OPENED HIS EYES  AND THEY ROLLED BACK  AND HE SLUMPED.CODE WAS CALLED AND HE RERTURNED 
TO HR0F IOO AFTER CPR AND 1 ROUND OF EPPY.

## 2022-08-20 NOTE — NUR
LAC IV FOUND TO BE REMOVED D/T PT MOVING AROUND. INSERTED 20G IV TO L FOREARM. PATENT, 
INTACT. SALINE LOCK.

## 2022-08-20 NOTE — NUR
DAUGHTER STATES HER FATHER IS ITCHING AND HE'S BLIND FOR THE PASS 18 MONTHS. DR ELIZALDE WAS 
TEXT AND INFORMED., HE GAVE ORDERS FOT THE ITCHING. PROPOFOL TUBING CHANGED AS PER PROTOCOL.

## 2022-08-20 NOTE — NUR
SACRAL WOUND CLEANSED WITH NS SPRAY AND APPLIED THERA-HONEY AND FOAM DRESSING. FOOT WOUND 
CLEANSED WITH IODINE AND WRAPPED IN GAUZE DRESSING AS ORDERED BY DR. MAURER. BOTH SACRAL 
DRESSING AND FOOT DRESSING CHANGED. PT TURNED AND REPOSITIONED WITH VIKI WINN. ALL COMFORT 
NEEDS MET AT THIS TIME.

## 2022-08-20 NOTE — NUR
RECEIVED REPORT FROM NIGHT TATUM MASSEY FOR CONTINUITY OF CARE. A&OX0, SEDATED ON PROPOFOL. 
ETT TO VENT FIO2 26%, , RR 16, PEEP 5. PICC TO DYLAN INFUSING PROPOFOL AT 15 MCG/KG/MIN. 
18G IV TO LAC, SALINE LOCK. OGT CLAMPED. BOWEL SOUNDS ACTIVE. F/C TO GRAVITY, NO URINE 
OUTPUT. GENERALIZED WEAKNESS. STANDARD PRECAUTION. CALL LIGHT WITHIN REACH. BED IN LOWEST 
POSITION.

## 2022-08-20 NOTE — NUR
RECEIVED REPORT FROM BABITA WINN. PT RECEIVED IN STATED CONDITION. PT'S DAUGHTER AT THE 
BEDSIDE. WHE SHE SPEAKS TO HER FATHER IN Uzbek HE FOLLOWS COMMANDS. PT IS STRUGGLING WITH 
THE RESTRAINTS; TRYING TO REACH THE ETT TUBE. HE REMAINS ON THE PRIOR VENT SETTING FIO2 26%. 
PEEP5, TIDAL  AND A RATE OF 16 NO DISTRESS NOTE .

## 2022-08-21 VITALS — SYSTOLIC BLOOD PRESSURE: 121 MMHG | DIASTOLIC BLOOD PRESSURE: 58 MMHG

## 2022-08-21 VITALS — DIASTOLIC BLOOD PRESSURE: 60 MMHG | SYSTOLIC BLOOD PRESSURE: 136 MMHG

## 2022-08-21 VITALS — SYSTOLIC BLOOD PRESSURE: 131 MMHG | DIASTOLIC BLOOD PRESSURE: 67 MMHG

## 2022-08-21 VITALS — DIASTOLIC BLOOD PRESSURE: 67 MMHG | SYSTOLIC BLOOD PRESSURE: 128 MMHG

## 2022-08-21 VITALS — DIASTOLIC BLOOD PRESSURE: 63 MMHG | SYSTOLIC BLOOD PRESSURE: 136 MMHG

## 2022-08-21 VITALS — SYSTOLIC BLOOD PRESSURE: 129 MMHG | DIASTOLIC BLOOD PRESSURE: 64 MMHG

## 2022-08-21 VITALS — DIASTOLIC BLOOD PRESSURE: 60 MMHG | SYSTOLIC BLOOD PRESSURE: 119 MMHG

## 2022-08-21 VITALS — DIASTOLIC BLOOD PRESSURE: 61 MMHG | SYSTOLIC BLOOD PRESSURE: 134 MMHG

## 2022-08-21 VITALS — SYSTOLIC BLOOD PRESSURE: 127 MMHG | DIASTOLIC BLOOD PRESSURE: 57 MMHG

## 2022-08-21 VITALS — DIASTOLIC BLOOD PRESSURE: 67 MMHG | SYSTOLIC BLOOD PRESSURE: 138 MMHG

## 2022-08-21 VITALS — SYSTOLIC BLOOD PRESSURE: 126 MMHG | DIASTOLIC BLOOD PRESSURE: 57 MMHG

## 2022-08-21 VITALS — DIASTOLIC BLOOD PRESSURE: 65 MMHG | SYSTOLIC BLOOD PRESSURE: 139 MMHG

## 2022-08-21 VITALS — SYSTOLIC BLOOD PRESSURE: 140 MMHG | DIASTOLIC BLOOD PRESSURE: 69 MMHG

## 2022-08-21 VITALS — DIASTOLIC BLOOD PRESSURE: 52 MMHG | SYSTOLIC BLOOD PRESSURE: 116 MMHG

## 2022-08-21 VITALS — SYSTOLIC BLOOD PRESSURE: 132 MMHG | DIASTOLIC BLOOD PRESSURE: 64 MMHG

## 2022-08-21 VITALS — SYSTOLIC BLOOD PRESSURE: 131 MMHG | DIASTOLIC BLOOD PRESSURE: 63 MMHG

## 2022-08-21 VITALS — SYSTOLIC BLOOD PRESSURE: 129 MMHG | DIASTOLIC BLOOD PRESSURE: 59 MMHG

## 2022-08-21 VITALS — DIASTOLIC BLOOD PRESSURE: 62 MMHG | SYSTOLIC BLOOD PRESSURE: 129 MMHG

## 2022-08-21 VITALS — SYSTOLIC BLOOD PRESSURE: 147 MMHG | DIASTOLIC BLOOD PRESSURE: 58 MMHG

## 2022-08-21 VITALS — DIASTOLIC BLOOD PRESSURE: 64 MMHG | SYSTOLIC BLOOD PRESSURE: 140 MMHG

## 2022-08-21 VITALS — SYSTOLIC BLOOD PRESSURE: 137 MMHG | DIASTOLIC BLOOD PRESSURE: 68 MMHG

## 2022-08-21 VITALS — DIASTOLIC BLOOD PRESSURE: 62 MMHG | SYSTOLIC BLOOD PRESSURE: 131 MMHG

## 2022-08-21 VITALS — DIASTOLIC BLOOD PRESSURE: 65 MMHG | SYSTOLIC BLOOD PRESSURE: 132 MMHG

## 2022-08-21 VITALS — SYSTOLIC BLOOD PRESSURE: 129 MMHG | DIASTOLIC BLOOD PRESSURE: 57 MMHG

## 2022-08-21 VITALS — SYSTOLIC BLOOD PRESSURE: 137 MMHG | DIASTOLIC BLOOD PRESSURE: 65 MMHG

## 2022-08-21 VITALS — SYSTOLIC BLOOD PRESSURE: 129 MMHG | DIASTOLIC BLOOD PRESSURE: 63 MMHG

## 2022-08-21 VITALS — SYSTOLIC BLOOD PRESSURE: 138 MMHG | DIASTOLIC BLOOD PRESSURE: 68 MMHG

## 2022-08-21 VITALS — SYSTOLIC BLOOD PRESSURE: 128 MMHG | DIASTOLIC BLOOD PRESSURE: 67 MMHG

## 2022-08-21 LAB
ANION GAP SERPL CALCULATED.3IONS-SCNC: 19.4 MMOL/L (ref 8–16)
BASOPHILS # BLD AUTO: 0.1 K/UL (ref 0–0.22)
BASOPHILS NFR BLD AUTO: 0.3 % (ref 0–2)
BUN SERPL-MCNC: 41 MG/DL (ref 7–18)
CHLORIDE SERPL-SCNC: 98 MMOL/L (ref 98–107)
CO2 SERPL-SCNC: 21.8 MMOL/L (ref 21–32)
CREAT SERPL-MCNC: 4.6 MG/DL (ref 0.6–1.3)
EOSINOPHIL # BLD AUTO: 0.5 K/UL (ref 0–0.4)
EOSINOPHIL NFR BLD AUTO: 2.6 % (ref 0–4)
ERYTHROCYTE [DISTWIDTH] IN BLOOD BY AUTOMATED COUNT: 15.7 % (ref 11.6–13.7)
GFR SERPL CREATININE-BSD FRML MDRD: 17 ML/MIN (ref 90–?)
GLUCOSE SERPL-MCNC: 114 MG/DL (ref 74–106)
HCT VFR BLD AUTO: 29.4 % (ref 36–52)
HGB BLD-MCNC: 9.8 G/DL (ref 12–18)
LYMPHOCYTES # BLD AUTO: 0.9 K/UL (ref 2–11.5)
LYMPHOCYTES NFR BLD AUTO: 4.7 % (ref 20.5–51.1)
MCH RBC QN AUTO: 30 PG (ref 27–31)
MCHC RBC AUTO-ENTMCNC: 33 G/DL (ref 33–37)
MCV RBC AUTO: 89.3 FL (ref 80–94)
MONOCYTES # BLD AUTO: 1.3 K/UL (ref 0.8–1)
MONOCYTES NFR BLD AUTO: 6.8 % (ref 1.7–9.3)
NEUTROPHILS # BLD AUTO: 16.4 K/UL (ref 1.8–7.7)
NEUTROPHILS NFR BLD AUTO: 85.6 % (ref 42.2–75.2)
PLATELET # BLD AUTO: 319 K/UL (ref 140–450)
POTASSIUM SERPL-SCNC: 4.2 MMOL/L (ref 3.5–5.1)
RBC # BLD AUTO: 3.3 MIL/UL (ref 4.2–6.1)
SODIUM SERPL-SCNC: 135 MMOL/L (ref 136–145)
WBC # BLD AUTO: 19.1 K/UL (ref 4.8–10.8)

## 2022-08-21 PROCEDURE — 5A1D70Z PERFORMANCE OF URINARY FILTRATION, INTERMITTENT, LESS THAN 6 HOURS PER DAY: ICD-10-PCS

## 2022-08-21 RX ADMIN — IPRATROPIUM BROMIDE AND ALBUTEROL SULFATE SCH ML: .5; 3 SOLUTION RESPIRATORY (INHALATION) at 13:51

## 2022-08-21 RX ADMIN — DEXTROSE SCH MLS/HR: 50 INJECTION, SOLUTION INTRAVENOUS at 01:56

## 2022-08-21 RX ADMIN — PANTOPRAZOLE SODIUM SCH MG: 40 TABLET, DELAYED RELEASE ORAL at 08:19

## 2022-08-21 RX ADMIN — SODIUM CHLORIDE SCH MLS/HR: 9 INJECTION, SOLUTION INTRAVENOUS at 20:29

## 2022-08-21 RX ADMIN — DEXTROSE SCH MLS/HR: 50 INJECTION, SOLUTION INTRAVENOUS at 11:12

## 2022-08-21 RX ADMIN — IPRATROPIUM BROMIDE AND ALBUTEROL SULFATE SCH ML: .5; 3 SOLUTION RESPIRATORY (INHALATION) at 19:40

## 2022-08-21 RX ADMIN — Medication SCH DEV: at 11:17

## 2022-08-21 RX ADMIN — IPRATROPIUM BROMIDE AND ALBUTEROL SULFATE SCH ML: .5; 3 SOLUTION RESPIRATORY (INHALATION) at 07:33

## 2022-08-21 RX ADMIN — Medication SCH DEV: at 21:00

## 2022-08-21 RX ADMIN — PROPOFOL PRN MLS/HR: 10 INJECTION, EMULSION INTRAVENOUS at 11:29

## 2022-08-21 RX ADMIN — Medication SCH MG: at 08:18

## 2022-08-21 RX ADMIN — Medication SCH TAB: at 08:18

## 2022-08-21 RX ADMIN — Medication SCH DEV: at 17:18

## 2022-08-21 RX ADMIN — Medication SCH DEV: at 07:40

## 2022-08-21 RX ADMIN — Medication SCH GM: at 13:15

## 2022-08-21 NOTE — NUR
SPOKE TO  AND UPDATED PHYSICIAN ON WEANING PARAMETERS AND STATUS. PHYSICIAN STATES 
TO EXTUBATE PT AT THIS TIME.

## 2022-08-21 NOTE — NUR
PT EXTUBATED TO 2LN/C AT THIS TIME. NO STRIDOR B/S CLEAR. SCHEDULED HHN TX GIVEN AT THIS 
TIME. PT HAS SLIGHT 

DISCOMFORT OF THE THROAT. WILL MONITOR FOR COOL AEROSOL IF NEEDED.

## 2022-08-21 NOTE — NUR
PATIENT ALERT AND ABLE TO ANSWER GENERAL QUESTIONS. PATIENT IS Filipino SPEAKING. LUNGS CLEAR 
TO AUSCULTATION. PATIENT EXTUBATED AT 1930 AND IS NOW ON NASAL CANULA 2L. NO SIGNS AND 
SYMPTOMS OF SOB. OGT TUBE TAKEN OUT. NPO DIET AT THIS TIME. SINUS RHYTHM.  DYLAN PICC LINE. 
LEFT WRIST 20 GAUGE. RIGHT IJ EDUARD CATHETER. LEFT HEEL WOUND, WRAP IS INTACT. PATIENT 
RESTING AND VS STABLE. NO S/SX OF PAIN AT THIS TIME.

## 2022-08-21 NOTE — NUR
PT PLACED ON SBT CPAP 5 PS 10 AND FIO2 26% PER . NURSE AWARE. VENT ALARMS ON AND 
FUNCTIONING. WILL CONTINUE TO MONITOR.

## 2022-08-21 NOTE — NUR
WEANING PARAMETERS NIF -[id number], VC 749ml, RSBI RANGING FROM 45-50, -308. PT ABLE TO 
FOLLOW SIMPLE COMMANDS. NURSE AWARE.

## 2022-08-21 NOTE — NUR
RECEIVED REPORT FROM NIGHT SHIFT SHILPA RN FOR CONTINUITY OF CARE. PT ABLE TO ANSWER YES OR 
NO QUESTIONS AND GESTURE TO MAKE NEEDS KNOWN. SEDATED ON PROPOFOL. ETT TO VENT FIO2 26%, VT 
500, RR 16, PEEP 5. RESPIRATIONS EVEN AND UNLABORED. SINGLE LUMEN PICC TO DYLAN INFUSING 
PROPOFOL AT 15 MCG/KG/MIN. PATENT IV IN L WRIST INFUSING NS TKO. OGT IS IN PLACE AND 
CLAMPED. MODERATE WEAKNESS. BILATERAL SOFT WRIST RESTRAINTS, NO S/S OF INJURY. STANDARD 
PRECAUTION. CALL LIGHT WITHIN REACH, BED IN LOWEST POSITION.

## 2022-08-21 NOTE — NUR
STARTED TUBE FEEDING NEPRO AT 10 ML/HR WITH 150ML WATER FLUSH Q6H PER DR. SARKAR. GOAL IS 60 
ML/HR.

## 2022-08-21 NOTE — NUR
CALLED DAUGHTER, MADINA, TO UPDATE ON PT STATUS REGARDING CPAP TRIALS. NO FURTHER QUESTIONS 
AT THIS TIME. pleasant, well nourished, well developed, in no acute distress , normal communication ability

## 2022-08-22 VITALS — DIASTOLIC BLOOD PRESSURE: 66 MMHG | SYSTOLIC BLOOD PRESSURE: 145 MMHG

## 2022-08-22 VITALS — DIASTOLIC BLOOD PRESSURE: 68 MMHG | SYSTOLIC BLOOD PRESSURE: 133 MMHG

## 2022-08-22 VITALS — DIASTOLIC BLOOD PRESSURE: 62 MMHG | SYSTOLIC BLOOD PRESSURE: 134 MMHG

## 2022-08-22 VITALS — DIASTOLIC BLOOD PRESSURE: 68 MMHG | SYSTOLIC BLOOD PRESSURE: 135 MMHG

## 2022-08-22 VITALS — SYSTOLIC BLOOD PRESSURE: 136 MMHG | DIASTOLIC BLOOD PRESSURE: 63 MMHG

## 2022-08-22 VITALS — SYSTOLIC BLOOD PRESSURE: 138 MMHG | DIASTOLIC BLOOD PRESSURE: 69 MMHG

## 2022-08-22 VITALS — DIASTOLIC BLOOD PRESSURE: 64 MMHG | SYSTOLIC BLOOD PRESSURE: 133 MMHG

## 2022-08-22 VITALS — SYSTOLIC BLOOD PRESSURE: 134 MMHG | DIASTOLIC BLOOD PRESSURE: 66 MMHG

## 2022-08-22 VITALS — DIASTOLIC BLOOD PRESSURE: 68 MMHG | SYSTOLIC BLOOD PRESSURE: 136 MMHG

## 2022-08-22 VITALS — DIASTOLIC BLOOD PRESSURE: 68 MMHG | SYSTOLIC BLOOD PRESSURE: 149 MMHG

## 2022-08-22 VITALS — SYSTOLIC BLOOD PRESSURE: 136 MMHG | DIASTOLIC BLOOD PRESSURE: 68 MMHG

## 2022-08-22 VITALS — SYSTOLIC BLOOD PRESSURE: 141 MMHG | DIASTOLIC BLOOD PRESSURE: 64 MMHG

## 2022-08-22 VITALS — SYSTOLIC BLOOD PRESSURE: 142 MMHG | DIASTOLIC BLOOD PRESSURE: 65 MMHG

## 2022-08-22 VITALS — DIASTOLIC BLOOD PRESSURE: 72 MMHG | SYSTOLIC BLOOD PRESSURE: 133 MMHG

## 2022-08-22 VITALS — SYSTOLIC BLOOD PRESSURE: 131 MMHG | DIASTOLIC BLOOD PRESSURE: 69 MMHG

## 2022-08-22 VITALS — DIASTOLIC BLOOD PRESSURE: 71 MMHG | SYSTOLIC BLOOD PRESSURE: 135 MMHG

## 2022-08-22 LAB
ANION GAP SERPL CALCULATED.3IONS-SCNC: 23.7 MMOL/L (ref 8–16)
BARBITURATES UR QL SCN: NEGATIVE NG/ML
BASOPHILS # BLD AUTO: 0.1 K/UL (ref 0–0.22)
BASOPHILS NFR BLD AUTO: 0.7 % (ref 0–2)
BENZODIAZ UR QL SCN: NEGATIVE NG/ML
BUN SERPL-MCNC: 50 MG/DL (ref 7–18)
BZE UR QL SCN: NEGATIVE NG/ML
CANNABINOIDS UR QL SCN: NEGATIVE NG/ML
CHLORIDE SERPL-SCNC: 96 MMOL/L (ref 98–107)
CO2 SERPL-SCNC: 19.9 MMOL/L (ref 21–32)
CREAT SERPL-MCNC: 5.5 MG/DL (ref 0.6–1.3)
EOSINOPHIL # BLD AUTO: 0.8 K/UL (ref 0–0.4)
EOSINOPHIL NFR BLD AUTO: 4.7 % (ref 0–4)
ERYTHROCYTE [DISTWIDTH] IN BLOOD BY AUTOMATED COUNT: 16.1 % (ref 11.6–13.7)
GFR SERPL CREATININE-BSD FRML MDRD: 14 ML/MIN (ref 90–?)
GLUCOSE SERPL-MCNC: 106 MG/DL (ref 74–106)
HCT VFR BLD AUTO: 29.3 % (ref 36–52)
HGB BLD-MCNC: 9.6 G/DL (ref 12–18)
LYMPHOCYTES # BLD AUTO: 0.8 K/UL (ref 2–11.5)
LYMPHOCYTES NFR BLD AUTO: 5.1 % (ref 20.5–51.1)
MCH RBC QN AUTO: 29 PG (ref 27–31)
MCHC RBC AUTO-ENTMCNC: 33 G/DL (ref 33–37)
MCV RBC AUTO: 88.9 FL (ref 80–94)
MONOCYTES # BLD AUTO: 1.2 K/UL (ref 0.8–1)
MONOCYTES NFR BLD AUTO: 7.6 % (ref 1.7–9.3)
NEUTROPHILS # BLD AUTO: 13.4 K/UL (ref 1.8–7.7)
NEUTROPHILS NFR BLD AUTO: 81.9 % (ref 42.2–75.2)
OPIATES UR QL SCN: NEGATIVE NG/ML
PCP UR QL SCN: NEGATIVE NG/ML
PLATELET # BLD AUTO: 339 K/UL (ref 140–450)
POTASSIUM SERPL-SCNC: 4.6 MMOL/L (ref 3.5–5.1)
RBC # BLD AUTO: 3.29 MIL/UL (ref 4.2–6.1)
SODIUM SERPL-SCNC: 135 MMOL/L (ref 136–145)
WBC # BLD AUTO: 16.3 K/UL (ref 4.8–10.8)

## 2022-08-22 PROCEDURE — 0JPT3XZ REMOVAL OF TUNNELED VASCULAR ACCESS DEVICE FROM TRUNK SUBCUTANEOUS TISSUE AND FASCIA, PERCUTANEOUS APPROACH: ICD-10-PCS | Performed by: SURGERY

## 2022-08-22 PROCEDURE — 02PYX3Z REMOVAL OF INFUSION DEVICE FROM GREAT VESSEL, EXTERNAL APPROACH: ICD-10-PCS | Performed by: SURGERY

## 2022-08-22 RX ADMIN — SODIUM CHLORIDE SCH MLS/HR: 9 INJECTION, SOLUTION INTRAVENOUS at 21:33

## 2022-08-22 RX ADMIN — Medication SCH MG: at 09:29

## 2022-08-22 RX ADMIN — IPRATROPIUM BROMIDE AND ALBUTEROL SULFATE SCH ML: .5; 3 SOLUTION RESPIRATORY (INHALATION) at 07:37

## 2022-08-22 RX ADMIN — Medication SCH DEV: at 07:40

## 2022-08-22 RX ADMIN — Medication SCH DEV: at 11:43

## 2022-08-22 RX ADMIN — IPRATROPIUM BROMIDE AND ALBUTEROL SULFATE SCH ML: .5; 3 SOLUTION RESPIRATORY (INHALATION) at 13:59

## 2022-08-22 RX ADMIN — EPOETIN ALFA-EPBX SCH UNITS: 10000 INJECTION, SOLUTION INTRAVENOUS; SUBCUTANEOUS at 09:29

## 2022-08-22 RX ADMIN — SODIUM CHLORIDE SCH MLS/HR: 9 INJECTION, SOLUTION INTRAVENOUS at 09:30

## 2022-08-22 RX ADMIN — INSULIN LISPRO PRN UNITS: 100 INJECTION, SOLUTION INTRAVENOUS; SUBCUTANEOUS at 21:42

## 2022-08-22 RX ADMIN — Medication SCH DEV: at 17:45

## 2022-08-22 RX ADMIN — Medication SCH GM: at 13:00

## 2022-08-22 RX ADMIN — Medication SCH TAB: at 09:29

## 2022-08-22 RX ADMIN — Medication SCH DEV: at 21:43

## 2022-08-22 RX ADMIN — PANTOPRAZOLE SODIUM SCH MG: 40 INJECTION, POWDER, FOR SOLUTION INTRAVENOUS at 09:34

## 2022-08-22 NOTE — NUR
8/22/22 RD FOLLOW UP COMPLETED



PLEASE REFER TO NUTRITION ASSESSMENT UNDER CARE ACTIVITY FOR ESTIMATED NUTRITIONAL NEEDS. 



1. IF/WHEN PT PASSES SWALLOW EVALUATION, RECOMMEND RENAL DIET + CCHO 60 GM, AS TOLERATED

2. RECOMMEND PROSOURCE BID FOR NUTRITION THERAPY AND WOUND HEALING.

-WILL PROVIDE: 120 KCAL + 30 G OF PROTEIN, DAILY 

3. RD TO FOLLOW-UP 3-5 DAYS, MODERATE RISK



REVIEWED BY FLAVIO SANCHEZ RD

## 2022-08-22 NOTE — NUR
DC PLANNING:

ORDER RECEIVED FOR TRANSFER TO [facility] PER FAMILY REQUEST.

MOE SPOKE WITH [facility], THEY ASKED THAT ORDER AND CLINICAL PACKET BE FAXED TO THEM FOR 
NURSING REVIEW AND APPROVAL.

MOE ALSO SPOKE WITH [facility], NO ICU BEDS AT THIS TIME.

CM WILL FOLLOW.

-------------------------------------------------------------------------------

Addendum: 08/23/22 at 1122 by Norma Varma CM

-------------------------------------------------------------------------------

DC PLANNING:

THE PATIENT ADMITTED FROM HOME, BIBA WITH C/O RESPIRATORY DISTRESS, NOTED TO HAVE AGONAL 
BREATHING IN THE FIELD. H/O ESRD WITH HD, HTN AND DM. THE PATIENT WAS UNRESPONSIVE UPON 
ARRIVAL AND WAS INTUBATED AND STARTED ON A PROPOFOL GTT. THE PATIENT HAD BEEN AT [facility] FOR 
1.5 MONTHS AND WAS DC'D TO HOME ON JULY 7TH ON IV ABX. CXR SHOWED PULMONARY EDEMA, PLEURAL 
EFFUSIONS WITH ATELECTASIS. WBC'S 25.9 ON ADMISSION, INCREASED TO 33.7 THE FOLLOWING DAY. 
GAP 18.7, ALK PHOS 570, TOTAL BILI 3.3. THE PATIENT WAS ADMITTED TO ICU WITH DX OF ARDS, 
SEPSIS, PHA, UTI AND SACRAL ULCER. ORDERS FOR CONSULTS WITH CARDIOLOGY, SURGERY, GI, 
PODIATRY, ID, NEPHROLOGY AND PULMONOLOGY. THE PATIENT CURRENTLY HAS HAD ALL LINES DC'D PER 
RECOMMENDATION OF SURGERY BECAUSE OF CONTINUED ELEVATED WBC'S. ON AMPICILLIN IV, PATIENT WAS 
EXTUBATED 8/21.

MOE SPOKE WITH THE PATIENTS DAUGHTER MADINA BY PHONE TO DISCUSS REQUEST FOR TRANSFER AND DCP.

MADINA STATES THAT SHE AND HER MOTHER NO LONGER WANT THE PATIENT TRANSFERRED BECAUSE HE'S 
DOING BETTER AND STATED THAT THEY WERE "FREAKED OUT" WHEN HE WAS FIRST ADMITTED HERE BECAUSE 
OF HIS CONDITION.

MOE CONFIRMED THAT PATIENTS ADDRESS AND PHONE NUMBER, HE LIVES WITH HIS WIFE AND DAUGHTER.

HE GOES TO [facility] ON M,W, F AND S AT 0930, HIS DAUGHTER TRANSPORTS HIM. HE IS 
ON SERVICE WITH [facility], MOE WILL SERVICE FOR RN AND P.T. UPON DISCHARGE.

THE PATIENT IS PRIMARILY WC BOUND AT HOME BUT IS ABLE TO STAND AND TRANSFER TO HIS BED AND 
COMMODE. HE IS ALMOST TOTAL ASSIST WITH ADL'S AND HAS BEEN WC BOUND FOR ABOUT A YEAR.HE HAS 
DME OF GLUCOMETER, FWW, FOUR WHEEL WALKER, WC, COMMODE, O2 AND B/P CUFF. HE DOES NOT 
NORMALLY USE HIS O2.

HE SEES A PMD AT [facility] IN [place] EVERY FEW MONTHS FOR FOLLOW UP AS NEEDED. 

THE DAUGHTER WOULD LIKE MOE TO REQUEST A HOSPITAL BED FROM HIS INSURANCE, MOE WILL FOLLOW UP 
WITH THIS.

THE DAUGHTER STATES THAT THE FAMILY DOES NOT WANT SNF PLACEMENT, HAS TAKEN HIM TO [facility] IN THE PAST BUT LEFT AFTER 20 MINUTES.

DCP IS FOR THE PATIENT TO RETURN HOME WITH FAMILY AND HOME HEALTH, MOE WILL FOLLOW.

## 2022-08-22 NOTE — NUR
SPOKE WITH DR ANDERSON REGARDING HD CATHETER REMOVAL AND REPLACEMENT. HE SAYS WE WILL DO HD 
TODAY IN CURRENT IJ EDUARD CATH, AND WILL EXPECT A CALL BACK FROM US FOR HIM TO REMOVE IJ 
EDUARD CATH. HE SAYS HE WILL PLAN TO REINSERT TOMORROW.

## 2022-08-22 NOTE — NUR
RIGHT ARM PICC LINE REMOVED AND APPLIED DRESSING. COLLECTED TIP OF THE CATHETER TO BE SENT 
TO LAB AS PER MD ORDERED. WILL ENDORSE TO THE NEXT SHIFT.

## 2022-08-22 NOTE — NUR
SPOKE WITH PT DAUGHTER, MADINA HASSAN, VIA PHONE. UPDATED REGARDING PT CONDITION AND 
EXPLAINED THE NEED FOR DIALYSIS CATHETER REMOVAL AND REPLACEMENT. OBTAINED TELEPHONE CONSENT 
AND WITNESSED WITH KAMLA WINN. ALL QUESTIONS ANSWERED AT THIS TIME. SHE SAYS SHE WILL COME 
VISIT IN APPROX 30 MIN.

## 2022-08-22 NOTE — NUR
DR. MAYER CAME AND REMOVED THE RIGHT SUBCLAVIAN EDUARD CATHETER AND COLLCECTED THE TIP TO BE 
SENT TO LAB. PER DR. MAYER, THE NEW EDUARD CATHETER WILL BE PLACED IN COUPLE OF DAYS. APPLIED 
PRESSURE DRESSING TO THE SITE. WILL ENDORSE TO THE NEXT SHIFT.

## 2022-08-22 NOTE — NUR
RECEIVED PT. FROM DAY SHIFT TATUM BYRD. PT. AOX3, Estonian SPEAKING. ON 2L NASAL CANNULA 
WITH O2 SAT 98%. DIET: MECHANICAL SOFT RENAL CCHO 60 GM. IV SITE LEFT WRIST 20G, RIGHT 
FOREARM 20G, RUNNING NS TKO. PT. RIGHT ARM PICC LINE AND RIGHT JUGULAR EDUARD NEEDS TO BE 
REMOVED AT ONCE AS PER DR. MAYER. AND TIP TO BE SENT TO LAB. PLAN TO REMOVE THE LINE ON 
WEDNESDAY. BARAJAS CATHETER PATENT AND INTACT WITH DANO CLOUDY URINE OUTPUT. SKIN NOT INTACT, 
PLEASE REFER TO CHART. HEMODIALYSIS DONE TODAY IN AM SHIFT WITH 1.6L OUTPUT. REPOSITIONED 
PT. BEDLOCK AND PLACED IN THE LOWEST HEIGHT. CALL LIGHT WITHIN REACH. WILL CONT.TO MONITOR.

## 2022-08-22 NOTE — NUR
SPOKE WITH DR JESS SINGH MD. HE ORDERED TO CULTURE PICC TIP AND IJ EDUARD CATH TIP. OK FOR 
HD TODAY, AFTER HD REMOVE/CULTURE BOTH LINES. STILL WAITING FOR CALL BACK FROM ON CALL 
SURGEON FOR HD CATHETER PLACEMENT.

## 2022-08-23 VITALS — SYSTOLIC BLOOD PRESSURE: 147 MMHG | DIASTOLIC BLOOD PRESSURE: 71 MMHG

## 2022-08-23 VITALS — SYSTOLIC BLOOD PRESSURE: 145 MMHG | DIASTOLIC BLOOD PRESSURE: 70 MMHG

## 2022-08-23 VITALS — DIASTOLIC BLOOD PRESSURE: 73 MMHG | SYSTOLIC BLOOD PRESSURE: 151 MMHG

## 2022-08-23 VITALS — SYSTOLIC BLOOD PRESSURE: 141 MMHG | DIASTOLIC BLOOD PRESSURE: 69 MMHG

## 2022-08-23 VITALS — DIASTOLIC BLOOD PRESSURE: 66 MMHG | SYSTOLIC BLOOD PRESSURE: 140 MMHG

## 2022-08-23 VITALS — DIASTOLIC BLOOD PRESSURE: 71 MMHG | SYSTOLIC BLOOD PRESSURE: 146 MMHG

## 2022-08-23 VITALS — DIASTOLIC BLOOD PRESSURE: 92 MMHG | SYSTOLIC BLOOD PRESSURE: 133 MMHG

## 2022-08-23 VITALS — SYSTOLIC BLOOD PRESSURE: 143 MMHG | DIASTOLIC BLOOD PRESSURE: 71 MMHG

## 2022-08-23 VITALS — DIASTOLIC BLOOD PRESSURE: 74 MMHG | SYSTOLIC BLOOD PRESSURE: 146 MMHG

## 2022-08-23 VITALS — SYSTOLIC BLOOD PRESSURE: 136 MMHG | DIASTOLIC BLOOD PRESSURE: 70 MMHG

## 2022-08-23 VITALS — SYSTOLIC BLOOD PRESSURE: 146 MMHG | DIASTOLIC BLOOD PRESSURE: 72 MMHG

## 2022-08-23 LAB
ANION GAP SERPL CALCULATED.3IONS-SCNC: 19.6 MMOL/L (ref 8–16)
BASOPHILS # BLD AUTO: 0.1 K/UL (ref 0–0.22)
BASOPHILS NFR BLD AUTO: 0.9 % (ref 0–2)
BUN SERPL-MCNC: 26 MG/DL (ref 7–18)
CHLORIDE SERPL-SCNC: 100 MMOL/L (ref 98–107)
CO2 SERPL-SCNC: 23.2 MMOL/L (ref 21–32)
CREAT SERPL-MCNC: 3.8 MG/DL (ref 0.6–1.3)
EOSINOPHIL # BLD AUTO: 0.4 K/UL (ref 0–0.4)
EOSINOPHIL NFR BLD AUTO: 3.3 % (ref 0–4)
ERYTHROCYTE [DISTWIDTH] IN BLOOD BY AUTOMATED COUNT: 16.1 % (ref 11.6–13.7)
GFR SERPL CREATININE-BSD FRML MDRD: 21 ML/MIN (ref 90–?)
GLUCOSE SERPL-MCNC: 113 MG/DL (ref 74–106)
HCT VFR BLD AUTO: 29.1 % (ref 36–52)
HGB BLD-MCNC: 9.7 G/DL (ref 12–18)
LYMPHOCYTES # BLD AUTO: 0.9 K/UL (ref 2–11.5)
LYMPHOCYTES NFR BLD AUTO: 7.4 % (ref 20.5–51.1)
MCH RBC QN AUTO: 30 PG (ref 27–31)
MCHC RBC AUTO-ENTMCNC: 33 G/DL (ref 33–37)
MCV RBC AUTO: 89.2 FL (ref 80–94)
MONOCYTES # BLD AUTO: 1.3 K/UL (ref 0.8–1)
MONOCYTES NFR BLD AUTO: 11 % (ref 1.7–9.3)
NEUTROPHILS # BLD AUTO: 9.1 K/UL (ref 1.8–7.7)
NEUTROPHILS NFR BLD AUTO: 77.4 % (ref 42.2–75.2)
PLATELET # BLD AUTO: 317 K/UL (ref 140–450)
POTASSIUM SERPL-SCNC: 4.8 MMOL/L (ref 3.5–5.1)
RBC # BLD AUTO: 3.27 MIL/UL (ref 4.2–6.1)
SODIUM SERPL-SCNC: 138 MMOL/L (ref 136–145)
WBC # BLD AUTO: 11.7 K/UL (ref 4.8–10.8)

## 2022-08-23 RX ADMIN — Medication SCH TAB: at 08:22

## 2022-08-23 RX ADMIN — SODIUM CHLORIDE SCH MLS/HR: 9 INJECTION, SOLUTION INTRAVENOUS at 21:04

## 2022-08-23 RX ADMIN — SODIUM CHLORIDE SCH MLS/HR: 9 INJECTION, SOLUTION INTRAVENOUS at 08:21

## 2022-08-23 RX ADMIN — IPRATROPIUM BROMIDE AND ALBUTEROL SULFATE SCH ML: .5; 3 SOLUTION RESPIRATORY (INHALATION) at 19:26

## 2022-08-23 RX ADMIN — INSULIN LISPRO PRN UNITS: 100 INJECTION, SOLUTION INTRAVENOUS; SUBCUTANEOUS at 21:37

## 2022-08-23 RX ADMIN — PANTOPRAZOLE SODIUM SCH MG: 40 INJECTION, POWDER, FOR SOLUTION INTRAVENOUS at 08:22

## 2022-08-23 RX ADMIN — Medication SCH DEV: at 07:30

## 2022-08-23 RX ADMIN — IPRATROPIUM BROMIDE AND ALBUTEROL SULFATE SCH ML: .5; 3 SOLUTION RESPIRATORY (INHALATION) at 13:55

## 2022-08-23 RX ADMIN — Medication SCH DEV: at 21:37

## 2022-08-23 RX ADMIN — Medication SCH DEV: at 19:04

## 2022-08-23 RX ADMIN — Medication SCH MG: at 08:22

## 2022-08-23 RX ADMIN — Medication SCH GM: at 13:00

## 2022-08-23 RX ADMIN — Medication SCH DEV: at 11:30

## 2022-08-23 RX ADMIN — IPRATROPIUM BROMIDE AND ALBUTEROL SULFATE SCH ML: .5; 3 SOLUTION RESPIRATORY (INHALATION) at 07:45

## 2022-08-23 NOTE — NUR
PT. DAUGHTER CALLED, HER NAME IS MADINA TO GET AN UPDATE WITH FATHER'S CONDITION. PROVIDED 
INFORMATION AND NO FURTHER QUESTIONS ASKED. ALSO ACCORDING TO THE DAUGHTER, PT. IS BLIND TO 
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 
ACCORDING TO HER PT. UNDERGONE MULTIPLE EYE SURGERIES BEFORE AND LAST ONE WAS DEC. 2021.

## 2022-08-23 NOTE — NUR
FED THE PT WITH HIS DINNER. PT EAT HIS FOOD AT 80%. ALL SAFETY MEASURES IMPLEMENTED. BED 
WHEELS ON LOCKED, BED IN LOW POSITION AND CALL LIGHT WITHIN REACH.

## 2022-08-23 NOTE — NUR
REDEIVED REPORT FROM NIGHT SHIFT PT. RESTING QUIETLY IN BED, SKIN DRY AND WARM TO TOUCH 
BREATHING  WITH O2 2L/NC O2 SAT95% IVLT WRIST #20 INFUSE NS KVO RT FA SALINE LOCK,PT, BLIND 
AND Italian SPEEKING

## 2022-08-23 NOTE — NUR
JUSTO SUGAR 114 NO INSULIN COVER NEEDED

-------------------------------------------------------------------------------

Addendum: 08/23/22 at 1942 by Rancho Ward RN

-------------------------------------------------------------------------------

 THIS NOTE IS NOT FOR THIS PATIENT.

## 2022-08-23 NOTE — NUR
received pt from icu , aaox4 , no s/sx of acute distress noted , on 02 at 2lpm/nc , o2 sat 
wnl ,  on tele monitor , plan of care discussed and verbalized understanding . call light 
within reach , w/ fc connecting to urine bag , w/ dressing on the previous site og hd access 
,  will cont. to monitor , both eye blind , on saline locks . i told to natalya  - she have 
to assign someone to feed the pt - he is feeder due to both eye blind . el hoang 
verbalizes understanding .

## 2022-08-23 NOTE — NUR
RECEIVED REPORT FROM MORNING SHIFT NURSE. PT IS LYING ON BED, SLEEPING. PT IS AOX4, BED 
REST, ABLE TO VERBALIZE NEEDS AND ABLE TO FOLLOW COMMANDS. PT IS ON 2L NC AND ON RENAL DIET. 
PT HAS BARAJAS CATHETER AND HAS SALINE LOCK ON LEFT WRIST G20 AND HAS IV ON RIGHT AC GAUGE 20. 
PT HAS LEFT HEAL WOUND AND OPEN WOUND ON SACRAL. ALL SAFETY MEASURES IMPLEMENTED. CALL LIGHT 
WITHIN REACH, BED IN LOW POSITION AND BED WHEELS ON LOCK.

## 2022-08-23 NOTE — NUR
DISCHARGE PLANNING 

PATIENT IS A 61 YEAR OLD  MALE ADMITTED TO THE [facility]/ED ON 8/18/2022 DUE TO HAVING 
RESPIRATORY DISTRESS. PATIENT IS COMING FROM HOME WITH DAUGHTER MADINA AT BEDSIDE WHO CALL 
911 AND PARAMEDICS REPORT THAT ON ARRIVAL PATIENT HAD A PULSE BUT HAD AGONAL BREATHING. 
PATIENT HAS MEDICAL HISTORY OF ESRD ON HD (MWF), DM2 AND HTN AND BIBA WITH COMPLAINTS OF 
SHORTNESS OF BREATH.

SW MEET WITH PATIENT AT BEDSIDE TO DISCUSS AND GATHER HIS COLLATERAL INFORMATION, PATIENT 
AWAKE HOWEVER; PATIENT STATED THAT HE WAS TIRED AND REQUESTED FOR THE SW TO CONTAC HIS 
DAUGHTER MADINA HASSAN TO PROVIDE WITH SOME OF HIS INFORMATION. SW AGREED AND CONTACTED 
PATIENT'S DAUGHTER MADINA HASSAN AT [phone] AND DISCUSS PATIENT'S INFORMATION. PER 
WYATTEN'S DAUGHTER HE LIVES AT HOME WITH HIS WIFE JANENE AND DAUGHTER BRIANA COELHO WHICH 
BOTH ARE HIS EMERGENCY CONTACT AND HIS MEDICAL DECISION MAKERS. PER PATIENT'S DAUGHTER HE DO 
NOT HAVE AN A.D. AND WHEN SW OFFERED A.D. INF. FORMS PATIENT DAUGHTER REQUESTED FOR SW TO 
LIVE INF. IN PATIENT'S ROOM. PER PATIENT'S DAUGHTER THEY HAVE GOOD FAMILY SUPPORT AND BUT DO 
HAVE SOME NEED FOR RESOURCES FOR IHSS. SW PROVIDED HER WITH RESOURCES OVER THE PHONE AND 
WILL ALSO LIVE INF. ON PATIENT'S ROOM FOR HIS DAUGHTER TO  WHEN SHE COMES SEE 
PATIENT. SHE AGREED AND THANKED THOMAS FOR THE INFORMATION AND RESOURCES. PATIENT'S DAUGHTER 
STATED THAT HE DID NOT HAVE A PCP HOWEVER; HE GOES TO A [facility] WHEN 
NEEDS TO SEE A DOCTOR. THOMAS DISCUSSED WITH PATIENT'S DAUGHTER THE IMPORTANCE OF MAKING A 
FOLLOW UP APPOINTMENT WITH IN 5-7 DAYS AFTER DISCHARGE AND OFFERED TO MAKE A FOLLOW UP 
APPOINTMENT FOR PATIENT AFTER HIS DC FROM [facility].  PATIENT'S AGREED AGREED AND THANKED THOMAS.  
PATIENT'S DAUGHTER STATED THAT HE HAS NO ISSUES GETTING OR TAKING HIS MEDICATIONS WHEN 
PRESCRIBED BY HIS DOCTOR. PATIENT GETS HIS MEDICATIONS FROM THE [facility] IN [facility] 
IN [place] AND [facility]. PER PATIENT'S DAUGHTER HE IS NOT INDEPENDENT NOW AND NEEDS 
ASSISTANCE AT HOME HE HAS A WHEELCHAIR, WALKER, AND O2 AS HIS DME AT HOME. PATIENT'S 
DAUGHTER REPORTED THAT PATIENT IS GETTING DIALYSIS M, W, AND F, AT [facility] [place] 
AND SHE IS THE ONE THAT TRANSPORT PATIENT. FOR HIS APPOINTMENTS. WHEN DISCUSSING PATIENT'S 
DISCHARGE PATIENT'S DAUGHTER STATED THAT  HE WILL RETURN HOME HER ASSISTANCE AFTER HIS DC 
FOR [facility].  SW WILL FOLLOW UP AS NEEDED.

-------------------------------------------------------------------------------

Addendum: 09/12/22 at 1900 by Concha Lau SS

-------------------------------------------------------------------------------

DISCHARGE PLANNING 

SW MEET WITH PATIENT AND HIS DAUGHTER MADINA AT BEDSIDE TO DISCUSS PATIENTS PROGRESS AND 
STATUS, DISCUSS POSSIBLE DISCHARGE TOMORROW HOME WITH HOME HEALTH.  PER PATIENTS DAUGHTER 
MADINA SHE WILL LIKE TO SPEAK TO PATIENT'S DOCTOR BEFORE HE IS DISCHARGE TO DISCUSS HIS NEEDS 
AT HOME AND SOME CONCERNS QUESTIONS ABOUT HIS HEALTH.  SW ENCOURAGE PATIENT'S DAUGHTER TO 
LIVE A MSG TO MD TO CALL HER AND ANSWER HER QUESTIONS AND CONCERN BEFORE PATIENT'S 
DISCHARGE.  PER PATIENT'S DAUGHTER MADINA HE IS GOING TO CONTINUE HOME HEALTH SERVICES WITH 
[facility] AT [phone] FAX [phone].  PATIENT'S DAUGHTER WAS THANKFUL 
TO THOMAS TO DISCUSS PATIENT'S STATUS, FOLLOW UP CARE AND WAS IN AGREEMENT WITH DISCHARGE PLAN.  
SW WILL FOLLOW UP AS NEEDED 





DISCHARGE PLANNING 

THOMAS CONTACTED HOME HEALTH SERVICES WITH [facility] AT [phone] FAX 
[phone] TO RE-START HOME HEALTH WHEN PATIENT IS DISCHARGE POSSIBLY TOMORROW.   SPOKE 
TO AJAY WHO REPORTED THAT SHE NEEDS PATIENT'S ORDERS FOR HOME HEALTH TO BE FAXED TO HER AT 
[phone].  THOMAS SEND FAXED TO AJAY PATIENTS CLINICAL INFORMATION WITH CONFIRMATION AT 
ABOUT 16:31 SW WILL FOLLOW UP AS NEEDED.

## 2022-08-23 NOTE — NUR
ALL SCHEDULED PRESCRIBED MEDICATION WAS GIVEN TO PT AS PER MD ORDER. PT TOLERATED IT WELL. 
ALL SAFETY MEASURES IMPLEMENTED. BED WHEELS ON LOCK, BED IN LOW POSITION AND CALL LIGHT 
WITHIN REACH.

## 2022-08-24 VITALS — DIASTOLIC BLOOD PRESSURE: 69 MMHG | SYSTOLIC BLOOD PRESSURE: 139 MMHG

## 2022-08-24 VITALS — SYSTOLIC BLOOD PRESSURE: 153 MMHG | DIASTOLIC BLOOD PRESSURE: 68 MMHG

## 2022-08-24 VITALS — DIASTOLIC BLOOD PRESSURE: 76 MMHG | SYSTOLIC BLOOD PRESSURE: 148 MMHG

## 2022-08-24 VITALS — SYSTOLIC BLOOD PRESSURE: 135 MMHG | DIASTOLIC BLOOD PRESSURE: 67 MMHG

## 2022-08-24 VITALS — SYSTOLIC BLOOD PRESSURE: 160 MMHG | DIASTOLIC BLOOD PRESSURE: 80 MMHG

## 2022-08-24 VITALS — SYSTOLIC BLOOD PRESSURE: 159 MMHG | DIASTOLIC BLOOD PRESSURE: 77 MMHG

## 2022-08-24 LAB
ANION GAP SERPL CALCULATED.3IONS-SCNC: 18.5 MMOL/L (ref 8–16)
BASOPHILS # BLD AUTO: 0.1 K/UL (ref 0–0.22)
BASOPHILS NFR BLD AUTO: 0.7 % (ref 0–2)
BUN SERPL-MCNC: 36 MG/DL (ref 7–18)
CHLORIDE SERPL-SCNC: 96 MMOL/L (ref 98–107)
CO2 SERPL-SCNC: 24.6 MMOL/L (ref 21–32)
CREAT SERPL-MCNC: 4.9 MG/DL (ref 0.6–1.3)
EOSINOPHIL # BLD AUTO: 0.5 K/UL (ref 0–0.4)
EOSINOPHIL NFR BLD AUTO: 3.9 % (ref 0–4)
ERYTHROCYTE [DISTWIDTH] IN BLOOD BY AUTOMATED COUNT: 16.2 % (ref 11.6–13.7)
GFR SERPL CREATININE-BSD FRML MDRD: 16 ML/MIN (ref 90–?)
GLUCOSE SERPL-MCNC: 177 MG/DL (ref 74–106)
HCT VFR BLD AUTO: 28.7 % (ref 36–52)
HGB BLD-MCNC: 9.7 G/DL (ref 12–18)
LYMPHOCYTES # BLD AUTO: 0.7 K/UL (ref 2–11.5)
LYMPHOCYTES NFR BLD AUTO: 5.4 % (ref 20.5–51.1)
MCH RBC QN AUTO: 30 PG (ref 27–31)
MCHC RBC AUTO-ENTMCNC: 34 G/DL (ref 33–37)
MCV RBC AUTO: 89.3 FL (ref 80–94)
MONOCYTES # BLD AUTO: 1.1 K/UL (ref 0.8–1)
MONOCYTES NFR BLD AUTO: 8.3 % (ref 1.7–9.3)
NEUTROPHILS # BLD AUTO: 11.2 K/UL (ref 1.8–7.7)
NEUTROPHILS NFR BLD AUTO: 81.7 % (ref 42.2–75.2)
PLATELET # BLD AUTO: 330 K/UL (ref 140–450)
POTASSIUM SERPL-SCNC: 5.1 MMOL/L (ref 3.5–5.1)
RBC # BLD AUTO: 3.22 MIL/UL (ref 4.2–6.1)
SODIUM SERPL-SCNC: 134 MMOL/L (ref 136–145)
WBC # BLD AUTO: 13.7 K/UL (ref 4.8–10.8)

## 2022-08-24 RX ADMIN — Medication SCH DEV: at 11:30

## 2022-08-24 RX ADMIN — PANTOPRAZOLE SODIUM SCH MG: 40 INJECTION, POWDER, FOR SOLUTION INTRAVENOUS at 09:37

## 2022-08-24 RX ADMIN — INSULIN LISPRO PRN UNITS: 100 INJECTION, SOLUTION INTRAVENOUS; SUBCUTANEOUS at 17:28

## 2022-08-24 RX ADMIN — IPRATROPIUM BROMIDE AND ALBUTEROL SULFATE SCH ML: .5; 3 SOLUTION RESPIRATORY (INHALATION) at 19:28

## 2022-08-24 RX ADMIN — Medication SCH GM: at 12:30

## 2022-08-24 RX ADMIN — INSULIN LISPRO PRN UNITS: 100 INJECTION, SOLUTION INTRAVENOUS; SUBCUTANEOUS at 12:30

## 2022-08-24 RX ADMIN — Medication SCH MG: at 09:38

## 2022-08-24 RX ADMIN — IPRATROPIUM BROMIDE AND ALBUTEROL SULFATE SCH ML: .5; 3 SOLUTION RESPIRATORY (INHALATION) at 07:51

## 2022-08-24 RX ADMIN — Medication SCH TAB: at 09:38

## 2022-08-24 RX ADMIN — Medication SCH DEV: at 06:35

## 2022-08-24 RX ADMIN — Medication SCH DEV: at 17:28

## 2022-08-24 RX ADMIN — INSULIN LISPRO PRN UNITS: 100 INJECTION, SOLUTION INTRAVENOUS; SUBCUTANEOUS at 06:38

## 2022-08-24 RX ADMIN — Medication SCH DEV: at 20:23

## 2022-08-24 RX ADMIN — SODIUM CHLORIDE SCH MLS/HR: 9 INJECTION, SOLUTION INTRAVENOUS at 09:39

## 2022-08-24 RX ADMIN — IPRATROPIUM BROMIDE AND ALBUTEROL SULFATE SCH ML: .5; 3 SOLUTION RESPIRATORY (INHALATION) at 14:19

## 2022-08-24 RX ADMIN — SODIUM CHLORIDE SCH MLS/HR: 9 INJECTION, SOLUTION INTRAVENOUS at 20:12

## 2022-08-24 RX ADMIN — EPOETIN ALFA-EPBX SCH UNITS: 10000 INJECTION, SOLUTION INTRAVENOUS; SUBCUTANEOUS at 09:37

## 2022-08-24 NOTE — NUR
RECEIVED REPORT FROM MORNING SHIFT NURSE. PT IS LYING ON BED, SLEEPING. PT IS AOX4, BED 
REST, ABLE TO VERBALIZE NEEDS AND ABLE TO FOLLOW COMMANDS. PT IS ON 4L NC AND ON RENAL DIET. 
PT HAS BARAJAS CATHETER AND HAS IV ON LEFT WRIST G20 AND HAS IV ON RIGHT AC GAUGE 20 RUNNING 
WITH NS AT 10ML/HR. PT HAS LEFT HEAL WOUND AND OPEN WOUND ON SACRAL. ALL SAFETY MEASURES 
IMPLEMENTED. CALL LIGHT WITHIN REACH, BED IN LOW POSITION AND BED WHEELS ON LOCK.

## 2022-08-24 NOTE — NUR
PT WAS GIVEN WARM BLANKET AS PT REQUEST. ALL SAFETY MEASURES IMPLEMENTED. CALL LIGHT WITHIN 
REACH, BED IN LOW POSITION AND BED WHEELS ON LOCK.

## 2022-08-24 NOTE — NUR
CHECKED THE PT, STILL ON SLEEP. CHEST RISE AND FALL SYMMETRICALLY NOTED. RESPIRATIONS ARE 
EVEN AND UNLABORED  SATING AT 93% WITH P-74 AND WITH NORMAL SINUS RHYTHM. ALL SAFETY 
MEASURES IMPLEMENTED. BED WHEELS ON LOCKED, BED IN LOW POSITION AND CALL LIGHT WITHIN REACH.

## 2022-08-24 NOTE — NUR
RECEIVED REPORT FROM NIGHT SHIFT NURSE. PATIENT LYING DOWN IN BED, NO DISTRESS NOTED. ON NC 
2L/MIN. IV SITE INTACT, PATENT, AND ON SALINE LOCK. BARAJAS CATH IN PLACE. RU CHEST TUNNELED 
HD CATH DRESSINGS IN PLACE, NO BLEEDING. REVIEWED PLAN OF CARE WITH PATIENT. REINFORCEMENT 
NEEDED. SAFETY MEASURES IN PLACE, CALL LIGHT WITHIN REACH. WILL CONTINUE TO MONITOR.

## 2022-08-24 NOTE — NUR
ALL SCHEDULED PRESCRIBED WAS GIVEN TO PT AS PER MD ORDER. PT TOLERATED IT WELL. ALL SAFETY 
MEASURES IMPLEMENTED. CALL LIGHT WITHIN REACH, BED IN LOW POSITION AND BED WHEELS ON LOCK.

## 2022-08-24 NOTE — NUR
PT IS LYING ON THE BED AND SLEEP. CHEST RISE AND FALL SYMMETRICALLY NOTED. RESPIRATIONS ARE 
EVEN AND UNLABORED. ALL SAFETY MEASURES IMPLEMENTED. BED WHEELS ON LOCKED, BED IN LOW 
POSITION AND CALL LIGHT WITHIN REACH.

## 2022-08-24 NOTE — NUR
PT WAS GIVEN WATER AS PER PT REQUEST AND LOW THE HEAD OF THE BED WITH SEMI-ASTUDILLO'S 
POSITION. ALL SAFETY MEASURES IMPLEMENTED. BED WHEELS ON LOCKED, BED IN LOW POSITION AND 
CALL LIGHT WITHIN REACH.

## 2022-08-24 NOTE — NUR
PT BLOOD GLUCOSE . HUMALOG INSULIN 2 UNITS WAS GIVEN TO PT. ALL SAFETY MEASURES 
IMPLEMENTED. BED WHEELS ON LOCKED, BED IN LOW POSITION AND CALL LIGHT WITHIN REACH.

## 2022-08-25 VITALS — DIASTOLIC BLOOD PRESSURE: 76 MMHG | SYSTOLIC BLOOD PRESSURE: 115 MMHG

## 2022-08-25 VITALS — DIASTOLIC BLOOD PRESSURE: 71 MMHG | SYSTOLIC BLOOD PRESSURE: 154 MMHG

## 2022-08-25 LAB
ANION GAP SERPL CALCULATED.3IONS-SCNC: 21.1 MMOL/L (ref 8–16)
BASOPHILS # BLD AUTO: 0.1 K/UL (ref 0–0.22)
BASOPHILS NFR BLD AUTO: 0.9 % (ref 0–2)
BUN SERPL-MCNC: 44 MG/DL (ref 7–18)
CHLORIDE SERPL-SCNC: 94 MMOL/L (ref 98–107)
CO2 SERPL-SCNC: 23.4 MMOL/L (ref 21–32)
CREAT SERPL-MCNC: 5.8 MG/DL (ref 0.6–1.3)
EOSINOPHIL # BLD AUTO: 0.6 K/UL (ref 0–0.4)
EOSINOPHIL NFR BLD AUTO: 4.5 % (ref 0–4)
ERYTHROCYTE [DISTWIDTH] IN BLOOD BY AUTOMATED COUNT: 16.4 % (ref 11.6–13.7)
GFR SERPL CREATININE-BSD FRML MDRD: 13 ML/MIN (ref 90–?)
GLUCOSE SERPL-MCNC: 188 MG/DL (ref 74–106)
HCT VFR BLD AUTO: 28.7 % (ref 36–52)
HGB BLD-MCNC: 9.5 G/DL (ref 12–18)
LYMPHOCYTES # BLD AUTO: 1 K/UL (ref 2–11.5)
LYMPHOCYTES NFR BLD AUTO: 7.1 % (ref 20.5–51.1)
MCH RBC QN AUTO: 30 PG (ref 27–31)
MCHC RBC AUTO-ENTMCNC: 33 G/DL (ref 33–37)
MCV RBC AUTO: 90 FL (ref 80–94)
MONOCYTES # BLD AUTO: 1.4 K/UL (ref 0.8–1)
MONOCYTES NFR BLD AUTO: 10.1 % (ref 1.7–9.3)
NEUTROPHILS # BLD AUTO: 10.5 K/UL (ref 1.8–7.7)
NEUTROPHILS NFR BLD AUTO: 77.4 % (ref 42.2–75.2)
PLATELET # BLD AUTO: 320 K/UL (ref 140–450)
POTASSIUM SERPL-SCNC: 5.5 MMOL/L (ref 3.5–5.1)
RBC # BLD AUTO: 3.18 MIL/UL (ref 4.2–6.1)
SODIUM SERPL-SCNC: 133 MMOL/L (ref 136–145)
WBC # BLD AUTO: 13.5 K/UL (ref 4.8–10.8)

## 2022-08-25 RX ADMIN — Medication SCH DEV: at 11:59

## 2022-08-25 RX ADMIN — Medication SCH TAB: at 08:37

## 2022-08-25 RX ADMIN — SODIUM CHLORIDE SCH MLS/HR: 9 INJECTION, SOLUTION INTRAVENOUS at 08:49

## 2022-08-25 RX ADMIN — Medication SCH GM: at 14:30

## 2022-08-25 RX ADMIN — Medication SCH DEV: at 06:32

## 2022-08-25 RX ADMIN — IPRATROPIUM BROMIDE AND ALBUTEROL SULFATE SCH ML: .5; 3 SOLUTION RESPIRATORY (INHALATION) at 19:12

## 2022-08-25 RX ADMIN — PANTOPRAZOLE SODIUM SCH MG: 40 INJECTION, POWDER, FOR SOLUTION INTRAVENOUS at 08:36

## 2022-08-25 RX ADMIN — INSULIN LISPRO PRN UNITS: 100 INJECTION, SOLUTION INTRAVENOUS; SUBCUTANEOUS at 21:52

## 2022-08-25 RX ADMIN — IPRATROPIUM BROMIDE AND ALBUTEROL SULFATE SCH ML: .5; 3 SOLUTION RESPIRATORY (INHALATION) at 07:09

## 2022-08-25 RX ADMIN — Medication SCH DEV: at 21:51

## 2022-08-25 RX ADMIN — SODIUM CHLORIDE SCH MLS/HR: 9 INJECTION, SOLUTION INTRAVENOUS at 21:31

## 2022-08-25 RX ADMIN — INSULIN LISPRO PRN UNITS: 100 INJECTION, SOLUTION INTRAVENOUS; SUBCUTANEOUS at 12:04

## 2022-08-25 RX ADMIN — INSULIN LISPRO PRN UNITS: 100 INJECTION, SOLUTION INTRAVENOUS; SUBCUTANEOUS at 06:34

## 2022-08-25 RX ADMIN — Medication SCH DEV: at 17:26

## 2022-08-25 RX ADMIN — Medication SCH MG: at 08:36

## 2022-08-25 RX ADMIN — INSULIN LISPRO PRN UNITS: 100 INJECTION, SOLUTION INTRAVENOUS; SUBCUTANEOUS at 17:29

## 2022-08-25 NOTE — NUR
ALL DUE MEDS GIVEN AS ORDERED. NO ADVERSE DRUG REACTION NOTED AND NO COMPLAIN FROM THE 
PATIENT. WILL CONTINUE OBSERVATION. CALL LIGHT WITHIN REACH.

## 2022-08-25 NOTE — NUR
MORNING WAS DONE TO PATIENT. CHANGED LINENS, GOWN, BLANKET, AND DIAPER. ALL SAFETY MEASURES 
IMPLEMENTED. BED WHEELS ON LOCKED, BED IN LOW POSITION AND CALL LIGHT WITHIN REACH.

## 2022-08-25 NOTE — NUR
PT VS BP-153/72, RR-18, ME-82, T-98.9 SATING AT 91%. PT HAS NO S/S OF RESPIRATORY DISTRESS 
NOTED. ALL SAFETY MEASURES IMPLEMENTED. CALL LIGHT WITHIN REACH, BED IN LOW POSITION AND BED 
WHEELS ON LOCK.

## 2022-08-25 NOTE — NUR
PT IS SLEEPING. CHEST RISE AND FALL SYMMETRICALLY NOTED. RESPIRATION ARE EVEN AND UNLABORED. 
NO S/S OF RESPIRATORY DISTRESS NOTED SATING AT 94%. ALL SAFETY MEASURES IMPLEMENTED. CALL 
LIGHT WITHIN REACH, BED IN LOW POSITION AND BED WHEELS ON LOCK.

## 2022-08-25 NOTE — NUR
Received pt a/ox4.  No c/o pain.  Sacral and L foot dressing done.  Pt awaiting possible 
Newton cath placement.  Pt remains on 4L/NC and taking Lokelma for higher potassium level.

## 2022-08-25 NOTE — NUR
CHECKED PT, STILL SLEEPING. CHEST RISE AND FALL SYMMETRICALLY NOTED. RESPIRATION ARE EVEN 
AND UNLABORED. NO S/S OF RESPIRATORY DISTRESS NOTED. ALL SAFETY MEASURES IMPLEMENTED. CALL 
LIGHT WITHIN REACH, BED IN LOW POSITION AND BED WHEELS ON LOCK.

## 2022-08-25 NOTE — NUR
RECEIVED BEDSIDE REPORT REGARDING THE PATIENT FROM DAY RN FOR CONTINUITY OF CARE. PATIENT 
WIFE AT THE BEDSIDE AND WANTED AN UPDATE ABOUT THE PATIENT.  PROVIDED, TATUM BAUER WITH THE TRANSLATION  FOR THE PATIENT AND THE PT WIFE. OTHERWISE PATIENT HAS NO 
COMPLAIN AT THIS TIME AND NOT IN ANY DISTRESS. BED IN LOW AND LOCKED POSITION. WILL CONTINUE 
POC.

## 2022-08-25 NOTE — NUR
CNA AT BEDSIDE ASSISTING PATIENT WITH LUNCH NUTRITION NO DISTRESS NOTED RCP TO ATTEMPT HHN 
THERAPY AT A LATER TIME

## 2022-08-26 VITALS — DIASTOLIC BLOOD PRESSURE: 76 MMHG | SYSTOLIC BLOOD PRESSURE: 159 MMHG

## 2022-08-26 VITALS — SYSTOLIC BLOOD PRESSURE: 162 MMHG | DIASTOLIC BLOOD PRESSURE: 73 MMHG

## 2022-08-26 VITALS — DIASTOLIC BLOOD PRESSURE: 67 MMHG | SYSTOLIC BLOOD PRESSURE: 150 MMHG

## 2022-08-26 LAB
ANION GAP SERPL CALCULATED.3IONS-SCNC: 21.9 MMOL/L (ref 8–16)
BASOPHILS # BLD AUTO: 0.2 K/UL (ref 0–0.22)
BASOPHILS NFR BLD AUTO: 1 % (ref 0–2)
BUN SERPL-MCNC: 56 MG/DL (ref 7–18)
CHLORIDE SERPL-SCNC: 95 MMOL/L (ref 98–107)
CO2 SERPL-SCNC: 21.9 MMOL/L (ref 21–32)
CREAT SERPL-MCNC: 6.3 MG/DL (ref 0.6–1.3)
EOSINOPHIL # BLD AUTO: 0.5 K/UL (ref 0–0.4)
EOSINOPHIL NFR BLD AUTO: 3.5 % (ref 0–4)
ERYTHROCYTE [DISTWIDTH] IN BLOOD BY AUTOMATED COUNT: 16.9 % (ref 11.6–13.7)
GFR SERPL CREATININE-BSD FRML MDRD: 12 ML/MIN (ref 90–?)
GLUCOSE SERPL-MCNC: 148 MG/DL (ref 74–106)
HCT VFR BLD AUTO: 28.4 % (ref 36–52)
HGB BLD-MCNC: 9.4 G/DL (ref 12–18)
LYMPHOCYTES # BLD AUTO: 0.9 K/UL (ref 2–11.5)
LYMPHOCYTES NFR BLD AUTO: 6 % (ref 20.5–51.1)
MCH RBC QN AUTO: 30 PG (ref 27–31)
MCHC RBC AUTO-ENTMCNC: 33 G/DL (ref 33–37)
MCV RBC AUTO: 90.2 FL (ref 80–94)
MONOCYTES # BLD AUTO: 1.1 K/UL (ref 0.8–1)
MONOCYTES NFR BLD AUTO: 7.2 % (ref 1.7–9.3)
NEUTROPHILS # BLD AUTO: 12.7 K/UL (ref 1.8–7.7)
NEUTROPHILS NFR BLD AUTO: 82.3 % (ref 42.2–75.2)
PLATELET # BLD AUTO: 341 K/UL (ref 140–450)
POTASSIUM SERPL-SCNC: 5.8 MMOL/L (ref 3.5–5.1)
RBC # BLD AUTO: 3.15 MIL/UL (ref 4.2–6.1)
SODIUM SERPL-SCNC: 133 MMOL/L (ref 136–145)
WBC # BLD AUTO: 15.4 K/UL (ref 4.8–10.8)

## 2022-08-26 PROCEDURE — 02HV33Z INSERTION OF INFUSION DEVICE INTO SUPERIOR VENA CAVA, PERCUTANEOUS APPROACH: ICD-10-PCS

## 2022-08-26 PROCEDURE — 5A1D70Z PERFORMANCE OF URINARY FILTRATION, INTERMITTENT, LESS THAN 6 HOURS PER DAY: ICD-10-PCS

## 2022-08-26 PROCEDURE — 0JH63XZ INSERTION OF TUNNELED VASCULAR ACCESS DEVICE INTO CHEST SUBCUTANEOUS TISSUE AND FASCIA, PERCUTANEOUS APPROACH: ICD-10-PCS

## 2022-08-26 PROCEDURE — B5181ZA FLUOROSCOPY OF SUPERIOR VENA CAVA USING LOW OSMOLAR CONTRAST, GUIDANCE: ICD-10-PCS

## 2022-08-26 RX ADMIN — Medication SCH TAB: at 09:00

## 2022-08-26 RX ADMIN — IPRATROPIUM BROMIDE AND ALBUTEROL SULFATE SCH ML: .5; 3 SOLUTION RESPIRATORY (INHALATION) at 07:39

## 2022-08-26 RX ADMIN — Medication SCH GM: at 13:00

## 2022-08-26 RX ADMIN — Medication SCH DEV: at 11:30

## 2022-08-26 RX ADMIN — Medication SCH DEV: at 20:03

## 2022-08-26 RX ADMIN — GUAIFENESIN AND DEXTROMETHORPHAN PRN ML: 100; 10 SYRUP ORAL at 21:03

## 2022-08-26 RX ADMIN — IPRATROPIUM BROMIDE AND ALBUTEROL SULFATE SCH ML: .5; 3 SOLUTION RESPIRATORY (INHALATION) at 19:30

## 2022-08-26 RX ADMIN — IPRATROPIUM BROMIDE AND ALBUTEROL SULFATE SCH ML: .5; 3 SOLUTION RESPIRATORY (INHALATION) at 14:04

## 2022-08-26 RX ADMIN — SODIUM CHLORIDE SCH MLS/HR: 9 INJECTION, SOLUTION INTRAVENOUS at 12:48

## 2022-08-26 RX ADMIN — EPOETIN ALFA-EPBX SCH UNITS: 10000 INJECTION, SOLUTION INTRAVENOUS; SUBCUTANEOUS at 09:00

## 2022-08-26 RX ADMIN — Medication SCH DEV: at 17:07

## 2022-08-26 RX ADMIN — Medication SCH DEV: at 06:30

## 2022-08-26 RX ADMIN — Medication SCH MG: at 09:00

## 2022-08-26 RX ADMIN — SODIUM CHLORIDE SCH MLS/HR: 9 INJECTION, SOLUTION INTRAVENOUS at 20:03

## 2022-08-26 RX ADMIN — PANTOPRAZOLE SODIUM SCH MG: 40 INJECTION, POWDER, FOR SOLUTION INTRAVENOUS at 09:00

## 2022-08-26 NOTE — NUR
8/26/22 RD FOLLOW UP COMPLETED



PLEASE REFER TO NUTRITION ASSESSMENT UNDER CARE ACTIVITY FOR ESTIMATED NUTRITIONAL NEEDS. 



1. RECOMMEND CCHO 60 GM + RENAL DIET WITH MECHANICAL SOFT TEXTURE MODIFICATION AS TOLERATED.

2. RECOMMEND PROSOURCE BID FOR NUTRITION THERAPY WOUND HEALING. 

3. RD TO FOLLOW-UP 3-5 DAYS, MODERATE RISK



REVIEWED BY DAMIR HART RD

## 2022-08-26 NOTE — NUR
RECEIVED REPORT FROM AM NURSE. PATIENT IS ASLEEP, AROUSABLE BY VERBAL STIMULI. ON GOING 
DIALYSIS AS ORDERED. DENIES PAIN. NO RESPIRATORY DISTRESS NOTED. SKIN WARM AND DRY TO TOUCH. 
BED IN THE LOWEST AND LOCKED POSITION FOR SAFETY. CALL LIGHT IN REACH.

## 2022-08-26 NOTE — NUR
RIGHT IJ DIALYSIS ACCESS DRESSING CHANGED. CLEANED PATIENT, MADE COMFORTABLE IN BED. HEAD OF 
THE BED ELEVATED, CALL LIGHT IN REACH. PT APPRECIATIVE OF CARE.

## 2022-08-26 NOTE — NUR
MADE ROUNDS PATIENT ASLEEP AT THIS TIME. VISIBLE CHEST RISE AND FALL NOTED. WILL CONTINUE 
OBSERVATION. CALL LIGHT WITHIN REACH.

## 2022-08-26 NOTE — NUR
PATIENT VITALS SIGNS STABLE, AFEBRILE,SATING 94% ON 4L/NC. NOT IN ANY DISTRESS AND NO 
COMPLAIN AT THIS TIME. CALL LIGHT WITHIN REACH.

## 2022-08-26 NOTE — NUR
NO ACUTE EVENT THROUGHOUT THE NIGHT. PATIENT STABLE , NOT IN ANY DISTRESS AND NO COMPLAIN AT 
THIS TIME.ALL NEEDS ATTENDED. WILL ENDORSE THE PATIENT TO THE ONCOMING RN FOR CONTINUITY OF 
CARE.

## 2022-08-26 NOTE — NUR
PATIENT BLOOD SUGAR .HELD THE RISS . PATIENT NPO AFTER MIDNIGHT LAST NIGHT GOING FOR 
TUNNELED CATH THIS AFTERNOON.

## 2022-08-26 NOTE — NUR
Spoke to pt.'s daughter Renee who stated pt is able to give verbal consent for dialysis 
line. Pt is agreeable. Radiology tech informed

## 2022-08-26 NOTE — NUR
NO BLEEDING NOTED ON THE DRESSING IN THE RIGHT IJ DIALYSIS ACCESS AT THIS TIME. DENIES PAIN 
AT THIS TIME.

## 2022-08-27 VITALS — SYSTOLIC BLOOD PRESSURE: 179 MMHG | DIASTOLIC BLOOD PRESSURE: 76 MMHG

## 2022-08-27 VITALS — SYSTOLIC BLOOD PRESSURE: 160 MMHG | DIASTOLIC BLOOD PRESSURE: 78 MMHG

## 2022-08-27 VITALS — SYSTOLIC BLOOD PRESSURE: 144 MMHG | DIASTOLIC BLOOD PRESSURE: 67 MMHG

## 2022-08-27 LAB
ANION GAP SERPL CALCULATED.3IONS-SCNC: 19.2 MMOL/L (ref 8–16)
BASOPHILS # BLD AUTO: 0.1 K/UL (ref 0–0.22)
BASOPHILS NFR BLD AUTO: 0.8 % (ref 0–2)
BUN SERPL-MCNC: 33 MG/DL (ref 7–18)
CHLORIDE SERPL-SCNC: 96 MMOL/L (ref 98–107)
CO2 SERPL-SCNC: 27.2 MMOL/L (ref 21–32)
CREAT SERPL-MCNC: 4.4 MG/DL (ref 0.6–1.3)
EOSINOPHIL # BLD AUTO: 0.2 K/UL (ref 0–0.4)
EOSINOPHIL NFR BLD AUTO: 1.3 % (ref 0–4)
ERYTHROCYTE [DISTWIDTH] IN BLOOD BY AUTOMATED COUNT: 17.4 % (ref 11.6–13.7)
GFR SERPL CREATININE-BSD FRML MDRD: 18 ML/MIN (ref 90–?)
GLUCOSE SERPL-MCNC: 167 MG/DL (ref 74–106)
HCT VFR BLD AUTO: 29.4 % (ref 36–52)
HGB BLD-MCNC: 9.5 G/DL (ref 12–18)
LYMPHOCYTES # BLD AUTO: 0.8 K/UL (ref 2–11.5)
LYMPHOCYTES NFR BLD AUTO: 5 % (ref 20.5–51.1)
MCH RBC QN AUTO: 29 PG (ref 27–31)
MCHC RBC AUTO-ENTMCNC: 32 G/DL (ref 33–37)
MCV RBC AUTO: 90.7 FL (ref 80–94)
MONOCYTES # BLD AUTO: 1.2 K/UL (ref 0.8–1)
MONOCYTES NFR BLD AUTO: 7.7 % (ref 1.7–9.3)
NEUTROPHILS # BLD AUTO: 13.1 K/UL (ref 1.8–7.7)
NEUTROPHILS NFR BLD AUTO: 85.2 % (ref 42.2–75.2)
PLATELET # BLD AUTO: 361 K/UL (ref 140–450)
POTASSIUM SERPL-SCNC: 4.4 MMOL/L (ref 3.5–5.1)
RBC # BLD AUTO: 3.24 MIL/UL (ref 4.2–6.1)
SODIUM SERPL-SCNC: 138 MMOL/L (ref 136–145)
WBC # BLD AUTO: 15.4 K/UL (ref 4.8–10.8)

## 2022-08-27 PROCEDURE — 5A1D70Z PERFORMANCE OF URINARY FILTRATION, INTERMITTENT, LESS THAN 6 HOURS PER DAY: ICD-10-PCS

## 2022-08-27 RX ADMIN — INSULIN LISPRO PRN UNITS: 100 INJECTION, SOLUTION INTRAVENOUS; SUBCUTANEOUS at 06:32

## 2022-08-27 RX ADMIN — Medication SCH GM: at 13:25

## 2022-08-27 RX ADMIN — Medication SCH DEV: at 21:30

## 2022-08-27 RX ADMIN — SODIUM CHLORIDE SCH MLS/HR: 9 INJECTION, SOLUTION INTRAVENOUS at 22:00

## 2022-08-27 RX ADMIN — GUAIFENESIN AND DEXTROMETHORPHAN PRN ML: 100; 10 SYRUP ORAL at 05:28

## 2022-08-27 RX ADMIN — INSULIN LISPRO PRN UNITS: 100 INJECTION, SOLUTION INTRAVENOUS; SUBCUTANEOUS at 11:44

## 2022-08-27 RX ADMIN — Medication SCH DEV: at 17:41

## 2022-08-27 RX ADMIN — SODIUM CHLORIDE SCH MLS/HR: 9 INJECTION, SOLUTION INTRAVENOUS at 09:12

## 2022-08-27 RX ADMIN — PANTOPRAZOLE SODIUM SCH MG: 40 INJECTION, POWDER, FOR SOLUTION INTRAVENOUS at 09:11

## 2022-08-27 RX ADMIN — Medication SCH DEV: at 11:39

## 2022-08-27 RX ADMIN — Medication SCH MG: at 09:12

## 2022-08-27 RX ADMIN — Medication SCH TAB: at 09:13

## 2022-08-27 RX ADMIN — IPRATROPIUM BROMIDE AND ALBUTEROL SULFATE SCH ML: .5; 3 SOLUTION RESPIRATORY (INHALATION) at 19:45

## 2022-08-27 RX ADMIN — IPRATROPIUM BROMIDE AND ALBUTEROL SULFATE SCH ML: .5; 3 SOLUTION RESPIRATORY (INHALATION) at 13:30

## 2022-08-27 RX ADMIN — IPRATROPIUM BROMIDE AND ALBUTEROL SULFATE PRN ML: .5; 3 SOLUTION RESPIRATORY (INHALATION) at 01:15

## 2022-08-27 RX ADMIN — Medication SCH DEV: at 06:34

## 2022-08-27 RX ADMIN — IPRATROPIUM BROMIDE AND ALBUTEROL SULFATE SCH ML: .5; 3 SOLUTION RESPIRATORY (INHALATION) at 07:30

## 2022-08-27 NOTE — NUR
RECEIVED PT FROM [facility] RT, PT SEEN ON 4L BUBBLE NASAL CANNULA, PT DAUGHTER AT BEDSIDE. PT'S 
BREATH SOUNDS WERE COARSE THROUGHOUT. HHNTX WAS GIVEN AND COACHED PT TO EXCRETE ANY 
SECRETIONS AS NECESSARY. PT NOT IN ANY RESPIRATORY DISTRESS

## 2022-08-27 NOTE — NUR
RECEIVED ENDORSEMENT FROM DAY SHIFT NURSE FOR CONTINUITY OF CARE. PT IS AWAKE. ALERT AND 
VERBALLY RESPONSIVE. PT IS ON 10 LPM O2 INHALATION VIA NASAL CANNULA, O2 SAT RANGING 91-92%. 
PT IS ON RENAL MECHANICAL SOFT DIET. SALINE LOCK AT RIGHT FOREARM 20G INTACT AND PATENT. 
RIGHT IJ COVERED WITH DRY AND CLEAN DRESSING. LEFT HEEL COVERED WITH DRY AND CLEAN DRESSING. 
CONTINUE MONITORING.

## 2022-08-27 NOTE — NUR
WOUND CARE RE-EVALUATION NOTE:

INTEGUMENTARY:     

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 NOT WORSENING. 

-PRESSURE INJURY STAGE 3, SACROCOCCYX MOIST, NO ODOR, SERENA-WOUND SKIN MOIST, HEALING SCAR 
TISSUE, SURROUNDING NON-BLANCHABLE REDNESS INDICATED FURTHER DAMAGE. REMAINS THE SAME, NOT 
WORSENING. 

- PRESSURE INJURY UN-STAGEABLE, LEFT HEEL, BROWN/BLACK SLOUGH TISSUE, SMALL AMOUNT PURULRNT 
DRAINAGE, MILD ODOR, ENTIRE SERENA WOUND MUSHY, DENUDED SKIN WITH SURROUNDING DTI. REMAINS THE 
SAME, NOT WORSENING. 



RECOMMENDATIONS:

- CONTINUE WITH ALL CURRENT WOUND CARE MANAGEMENT REGIMEN AND PRESSURE ULCER PREVENTION 
MEASURES.

## 2022-08-27 NOTE — NUR
ROUNDING DONE. PATIENT ASLEEP. DAUGHTER AT THE BEDSIDE. NO S/SX OF PAIN NOR DISCOMFORT. CALL 
LIGHT IN REACH.

## 2022-08-27 NOTE — NUR
MELINDA PAINTER AT THE BEDSIDE, UPDATED ON PATIENT'S CONDITION, ANSWERED QUESTIONS AND  
ENCOURAGED TO CALL IF ASSISTANCE IS NEEDED

## 2022-08-27 NOTE — NUR
SMALL SMEAR OF STOOL NOTED, AM CARE RENDERED, ORAL CARE DONE. REPOSITIONED FOR COMFORT. 
ELEVATED HEAD OF THE BED. CALL LIGHT IN REACH.

## 2022-08-27 NOTE — NUR
PT WAS SWITCHED FROM REGULAR NASAL CANNULA TO OXYMIZER AT 10L DUE TO DESATURATION. AND IS 
COARSE THROUGHOUT, AND COACHED ON COUGHING UP SECRETIONS. PT TOLERATED OXYMIZER WELL.

## 2022-08-28 VITALS — SYSTOLIC BLOOD PRESSURE: 140 MMHG | DIASTOLIC BLOOD PRESSURE: 63 MMHG

## 2022-08-28 VITALS — DIASTOLIC BLOOD PRESSURE: 65 MMHG | SYSTOLIC BLOOD PRESSURE: 136 MMHG

## 2022-08-28 VITALS — DIASTOLIC BLOOD PRESSURE: 63 MMHG | SYSTOLIC BLOOD PRESSURE: 146 MMHG

## 2022-08-28 LAB
ANION GAP SERPL CALCULATED.3IONS-SCNC: 20.4 MMOL/L (ref 8–16)
BASOPHILS # BLD AUTO: 0.1 K/UL (ref 0–0.22)
BASOPHILS NFR BLD AUTO: 0.2 % (ref 0–2)
BUN SERPL-MCNC: 45 MG/DL (ref 7–18)
CHLORIDE SERPL-SCNC: 94 MMOL/L (ref 98–107)
CO2 SERPL-SCNC: 26 MMOL/L (ref 21–32)
CREAT SERPL-MCNC: 5.3 MG/DL (ref 0.6–1.3)
EOSINOPHIL # BLD AUTO: 0 K/UL (ref 0–0.4)
EOSINOPHIL NFR BLD AUTO: 0.1 % (ref 0–4)
ERYTHROCYTE [DISTWIDTH] IN BLOOD BY AUTOMATED COUNT: 17.8 % (ref 11.6–13.7)
GFR SERPL CREATININE-BSD FRML MDRD: 14 ML/MIN (ref 90–?)
GLUCOSE SERPL-MCNC: 190 MG/DL (ref 74–106)
HCT VFR BLD AUTO: 27.8 % (ref 36–52)
HGB BLD-MCNC: 9.1 G/DL (ref 12–18)
LYMPHOCYTES # BLD AUTO: 0.9 K/UL (ref 2–11.5)
LYMPHOCYTES NFR BLD AUTO: 2.8 % (ref 20.5–51.1)
MCH RBC QN AUTO: 30 PG (ref 27–31)
MCHC RBC AUTO-ENTMCNC: 33 G/DL (ref 33–37)
MCV RBC AUTO: 91.1 FL (ref 80–94)
MONOCYTES # BLD AUTO: 1.2 K/UL (ref 0.8–1)
MONOCYTES NFR BLD AUTO: 3.9 % (ref 1.7–9.3)
NEUTROPHILS # BLD AUTO: 28.7 K/UL (ref 1.8–7.7)
NEUTROPHILS NFR BLD AUTO: 93 % (ref 42.2–75.2)
PLATELET # BLD AUTO: 341 K/UL (ref 140–450)
POTASSIUM SERPL-SCNC: 4.4 MMOL/L (ref 3.5–5.1)
PROTHROMBIN TIME: 12.1 SECS (ref 10.8–13.4)
RBC # BLD AUTO: 3.05 MIL/UL (ref 4.2–6.1)
SODIUM SERPL-SCNC: 136 MMOL/L (ref 136–145)
WBC # BLD AUTO: 30.9 K/UL (ref 4.8–10.8)

## 2022-08-28 PROCEDURE — 5A1D70Z PERFORMANCE OF URINARY FILTRATION, INTERMITTENT, LESS THAN 6 HOURS PER DAY: ICD-10-PCS

## 2022-08-28 RX ADMIN — INSULIN LISPRO PRN UNITS: 100 INJECTION, SOLUTION INTRAVENOUS; SUBCUTANEOUS at 20:59

## 2022-08-28 RX ADMIN — PANTOPRAZOLE SODIUM SCH MG: 40 INJECTION, POWDER, FOR SOLUTION INTRAVENOUS at 09:37

## 2022-08-28 RX ADMIN — Medication SCH GM: at 13:00

## 2022-08-28 RX ADMIN — SODIUM CHLORIDE SCH MLS/HR: 9 INJECTION, SOLUTION INTRAVENOUS at 20:26

## 2022-08-28 RX ADMIN — IPRATROPIUM BROMIDE AND ALBUTEROL SULFATE SCH ML: .5; 3 SOLUTION RESPIRATORY (INHALATION) at 14:11

## 2022-08-28 RX ADMIN — INSULIN LISPRO PRN UNITS: 100 INJECTION, SOLUTION INTRAVENOUS; SUBCUTANEOUS at 07:52

## 2022-08-28 RX ADMIN — Medication SCH DEV: at 06:58

## 2022-08-28 RX ADMIN — Medication SCH DEV: at 20:52

## 2022-08-28 RX ADMIN — IPRATROPIUM BROMIDE AND ALBUTEROL SULFATE SCH ML: .5; 3 SOLUTION RESPIRATORY (INHALATION) at 20:00

## 2022-08-28 RX ADMIN — IPRATROPIUM BROMIDE AND ALBUTEROL SULFATE SCH ML: .5; 3 SOLUTION RESPIRATORY (INHALATION) at 07:43

## 2022-08-28 RX ADMIN — Medication SCH DEV: at 16:59

## 2022-08-28 RX ADMIN — Medication SCH TAB: at 09:39

## 2022-08-28 RX ADMIN — Medication SCH DEV: at 12:13

## 2022-08-28 RX ADMIN — SODIUM CHLORIDE SCH MLS/HR: 9 INJECTION, SOLUTION INTRAVENOUS at 09:34

## 2022-08-28 RX ADMIN — INSULIN LISPRO PRN UNITS: 100 INJECTION, SOLUTION INTRAVENOUS; SUBCUTANEOUS at 17:37

## 2022-08-28 NOTE — NUR
SPOKE WITH MARIXA OF [facility],STATED TO START FLAGYL AS SCHEDULED ON THE EMAR.

## 2022-08-28 NOTE — NUR
SATURATION 98% ON SUPPLEMENTAL OXYGEN AT 8 LPM VIA OXYMIZER NASOTRACHEAL SUCTION REQUIRED 
USING A STERILE TECHNIQUE APPLIED [place] LUBRICANT TO THE DISTAL END OF A 14 FR SUCTION CATHETER 
INSERTED INTO RIGHT SEPTUM X 2 SUCTIONED FOR LARGE THIN YELLOW TO HAZY SECRETIONS FOLLOWED 
BY HHN THERAPY POST HHN THERAPY TITRATED FIO2 TO 6 LPM VIA 0XYMIZER RCP TO MONITOR OSEI/RN 
NOTIFIED

## 2022-08-28 NOTE — NUR
PATIENT IN BED SLEEPING. GAVE MEDICATION WITHOUT INCIDENT. KEPT PATIENT COMFORTABLE. NO 
NOTED PAIN/DISCOMFORT. NO S/S OF RESPIRATORY DISTRESS. NURSING WILL FREQUENT ROOM FOR 
ANTICIPATED NEEDS. [email or web address]

## 2022-08-28 NOTE — NUR
ENDORSED TO DAY SHIFT NURSE FOR CONTINUITY OF CARE. PT IS RESPONSIVE ON STIMULI. GIVEN 2 
UNITS INSULIN FOR . ALL SAFETY MEASURES IN PLACE.

## 2022-08-28 NOTE — NUR
ENDORSE PATIENT TO PM SHIFT NURSE WITH STABLE CONDITION W/ NO ACUTE DISTRESS NOTED.  PIV R. 
FOREARM SALINE LOCK

## 2022-08-28 NOTE — NUR
PATIENT WOKE UP WITH A WET COUGH AND REQUEST COUGH MEDICATION AND SLEEPING PILL. NO NOTED 
ORDERS FOR  SLEEPING PILL. NURSING USED [employer]  [id number] TRANSLATED THE CONVERSATION 
AND NURSING REQUESTED SLEEP PRN. MD GAVE NEW ORDERS FOR ONCE FOR MELATONIN. NURSING WILL 
ADMINISTERED WHEN PHARMACY ACKNOWLEDGES THE ORDER. [email or web address]

## 2022-08-28 NOTE — NUR
SATURATION 97% ON SUPPLEMENTAL OXYGEN AT 6 LPM VIA OXYMIZER TITRATED FIO2 TO 5 LPM OSEI/RN 
NOTIFIED

## 2022-08-28 NOTE — NUR
RECEIVED SHIFT REPORT FROM SOEI WINN. PATIENT WAS STABLE DURING SHIFT REPORT. PATIENT IN BED 
ASLEEP. WITH OXYMIZER OXYGEN AT 5 LITERS. NO NOTED S/S OF PAIN/DISCOMFORT. NO NOTED 
RESPIRATORY DISTRESS. NOTED PATIENT MAKE AN MOAN AND WENT BACK TO SLEEP WHEN NURSING CALLED 
HIS NAME. SIDE RAILS UP X 2 FOR COMFORT. KEPT CLEAN AND DRY. BED NOTED AT THE LOWEST LEVEL 
SEMI ASTUDILLO POSITION. CALL LIGHT WITHIN REACH, BUT IN SHIFT REPORT PATIENT IS BLIND. NURSING 
WILL FREQUENT ROOM IN ANTICIPATION TO NEEDS AND WANTS. [email or web address]

## 2022-08-29 VITALS — DIASTOLIC BLOOD PRESSURE: 60 MMHG | SYSTOLIC BLOOD PRESSURE: 130 MMHG

## 2022-08-29 VITALS — SYSTOLIC BLOOD PRESSURE: 135 MMHG | DIASTOLIC BLOOD PRESSURE: 62 MMHG

## 2022-08-29 VITALS — SYSTOLIC BLOOD PRESSURE: 153 MMHG | DIASTOLIC BLOOD PRESSURE: 68 MMHG

## 2022-08-29 LAB
ANION GAP SERPL CALCULATED.3IONS-SCNC: 16.6 MMOL/L (ref 8–16)
APPEARANCE FLD: CLEAR
APPEARANCE SPUN FLD: CLEAR
BASOPHILS # BLD AUTO: 0.1 K/UL (ref 0–0.22)
BASOPHILS NFR BLD AUTO: 0.5 % (ref 0–2)
BODY FLD TYPE: (no result)
BUN SERPL-MCNC: 39 MG/DL (ref 7–18)
CHLORIDE SERPL-SCNC: 95 MMOL/L (ref 98–107)
CO2 SERPL-SCNC: 27.4 MMOL/L (ref 21–32)
COLOR FLD: YELLOW
CREAT SERPL-MCNC: 4.3 MG/DL (ref 0.6–1.3)
EOSINOPHIL # BLD AUTO: 0.3 K/UL (ref 0–0.4)
EOSINOPHIL NFR BLD AUTO: 1.3 % (ref 0–4)
ERYTHROCYTE [DISTWIDTH] IN BLOOD BY AUTOMATED COUNT: 18.2 % (ref 11.6–13.7)
GFR SERPL CREATININE-BSD FRML MDRD: 18 ML/MIN (ref 90–?)
GLUCOSE FLD-MCNC: 148 MG/DL
GLUCOSE SERPL-MCNC: 135 MG/DL (ref 74–106)
HCT VFR BLD AUTO: 27.1 % (ref 36–52)
HGB BLD-MCNC: 8.7 G/DL (ref 12–18)
LYMPHOCYTES # BLD AUTO: 1 K/UL (ref 2–11.5)
LYMPHOCYTES NFR BLD AUTO: 4.9 % (ref 20.5–51.1)
MCH RBC QN AUTO: 30 PG (ref 27–31)
MCHC RBC AUTO-ENTMCNC: 32 G/DL (ref 33–37)
MCV RBC AUTO: 91.7 FL (ref 80–94)
MONOCYTES # BLD AUTO: 1.2 K/UL (ref 0.8–1)
MONOCYTES NFR BLD AUTO: 5.9 % (ref 1.7–9.3)
NEUTROPHILS # BLD AUTO: 18.4 K/UL (ref 1.8–7.7)
NEUTROPHILS NFR BLD AUTO: 87.4 % (ref 42.2–75.2)
NEUTROPHILS NFR FLD MANUAL: 0 %
PLATELET # BLD AUTO: 345 K/UL (ref 140–450)
POTASSIUM SERPL-SCNC: 4 MMOL/L (ref 3.5–5.1)
RBC # BLD AUTO: 2.95 MIL/UL (ref 4.2–6.1)
RBC # FLD MANUAL: (no result) /CU. MM.
SODIUM SERPL-SCNC: 135 MMOL/L (ref 136–145)
SPECIMEN VOL FLD: 950 ML
WBC # BLD AUTO: 21.1 K/UL (ref 4.8–10.8)
WBC # FLD MANUAL: 0 /CU. MM.

## 2022-08-29 PROCEDURE — 0W9B3ZZ DRAINAGE OF LEFT PLEURAL CAVITY, PERCUTANEOUS APPROACH: ICD-10-PCS

## 2022-08-29 PROCEDURE — 5A1D70Z PERFORMANCE OF URINARY FILTRATION, INTERMITTENT, LESS THAN 6 HOURS PER DAY: ICD-10-PCS

## 2022-08-29 RX ADMIN — METRONIDAZOLE SCH MLS/HR: 500 SOLUTION INTRAVENOUS at 21:46

## 2022-08-29 RX ADMIN — EPOETIN ALFA-EPBX SCH UNITS: 10000 INJECTION, SOLUTION INTRAVENOUS; SUBCUTANEOUS at 10:06

## 2022-08-29 RX ADMIN — METRONIDAZOLE SCH MLS/HR: 500 SOLUTION INTRAVENOUS at 13:06

## 2022-08-29 RX ADMIN — Medication SCH DEV: at 08:21

## 2022-08-29 RX ADMIN — Medication SCH GM: at 13:03

## 2022-08-29 RX ADMIN — Medication SCH TAB: at 10:02

## 2022-08-29 RX ADMIN — IPRATROPIUM BROMIDE AND ALBUTEROL SULFATE SCH ML: .5; 3 SOLUTION RESPIRATORY (INHALATION) at 14:48

## 2022-08-29 RX ADMIN — PANTOPRAZOLE SODIUM SCH MG: 40 INJECTION, POWDER, FOR SOLUTION INTRAVENOUS at 10:04

## 2022-08-29 RX ADMIN — INSULIN LISPRO PRN UNITS: 100 INJECTION, SOLUTION INTRAVENOUS; SUBCUTANEOUS at 12:02

## 2022-08-29 RX ADMIN — Medication SCH DEV: at 11:47

## 2022-08-29 RX ADMIN — Medication SCH DEV: at 16:54

## 2022-08-29 RX ADMIN — IPRATROPIUM BROMIDE AND ALBUTEROL SULFATE SCH ML: .5; 3 SOLUTION RESPIRATORY (INHALATION) at 18:48

## 2022-08-29 RX ADMIN — Medication SCH DEV: at 20:54

## 2022-08-29 RX ADMIN — IPRATROPIUM BROMIDE AND ALBUTEROL SULFATE SCH ML: .5; 3 SOLUTION RESPIRATORY (INHALATION) at 07:06

## 2022-08-29 RX ADMIN — METRONIDAZOLE SCH MLS/HR: 500 SOLUTION INTRAVENOUS at 04:19

## 2022-08-29 NOTE — NUR
PATIENT EASY TO AROUSE. KEPT CLEAN AND DRY ALL NIGHT. NO NOTED S/S OF PAIN/DISCOMFORT. NO 
NOTED RESPIRATORY DISTRESS. SIDE RAILS UP X 2 FOR SAFETY AND COMFORT. NO NOTED SEIZURE 
ACTIVITY AT THIS SHIFT. CALL LIGHT WITHIN REACH. [email or web address]

## 2022-08-29 NOTE — NUR
RECEIVED ON SUPPLEMENTAL OXYGEN AT 5 LPM VIA OXYMIZER SATURATION 98% POST HHN THERAPY 
TITRATED FIO2 TO 4 LPM OSEI/RN NOTIFIED

## 2022-08-29 NOTE — NUR
ENDORSED PATIENT TO OSEI RN, PATIENT WAS STABLE AT THE CHANGE OF SHIFT. RECEIVED OVER THE 
PHONE CONSENT FOR PROCEDURE (THORACENTESIS) LATER TODAY. [email or web address]

## 2022-08-29 NOTE — NUR
DAUGHTER AT BEDSIDE PER ADMINISTRATION. PATIENT HAD BOWEL MOVEMENT NOT DIARRHEA. KEPT 
PATIENT CLEAN AND DRY. PATIENT WAS ABLE TO COUGH UP MUCUS. DAUGHTER WAS INFORMED HER MOTHER 
NEEDS TO COME IN TO SIGN CONSENT FOR AM PROCEDURE. PATIENT DENIES ANY PAIN/DISCOMFORT AT 
THIS TIME. SIDE RAILS X 2 FOR SAFETY AND COMFORT. [email or web address] .

## 2022-08-29 NOTE — NUR
****P.T. NOTES****

ATTEMPTED P.T. TX, DAUGHTER PRESENT, REQUESTED TO ALLOW PATIENT TO SLEEP & REST DUE TO 
FATIGUE; REVIEWED HEP; DTR APPRECIATIVE; BED ALARM, CALL BELL, PHONE, TABLE IN REACH; FF UP 
WHEN ABLE.

## 2022-08-29 NOTE — NUR
ENDORSE PATIENT TO PM SHIFT NURSE WITH STABLE CONDITION W/ NO ACUTE DISTRESS NOTED AFTER 
THORACENTESIS REMOVE 950ML FLUID AND DIALYSIS REMOVE 1 LITER.  PIV R. FOREARM SALINE LOCK

## 2022-08-29 NOTE — NUR
PATIENT REQUEST MEDICATION FOR SLEEP. MD GAVE NEW ORDERS AND PATIENT WAS ABLE TO TAKE 
MEDICATION WITHOUT INCIDENT. NO NOTED SIDE EFFECTS AT THIS TIME. PATIENT'S BED LOWER TO THE 
LOWEST LEVEL. SIDE RAILS UP X 2. CALL LIGHT WITHIN REACH. PATIENT IS IS BLIND AND NURSING 
WILL FREQUENT FOR ANTICIPATED NEEDS AND WANTS. NO RESPIRATORY DISTRESS. PATIENT IS BREATHING 
EVENLY WITHOUT COUGHING AT THIS TIME. [email or web address]

## 2022-08-29 NOTE — NUR
RECEIVED PATIENT FROM OSEI WINN, PATIENT WAS AWAKE AND STABLE. PATIENT WAS ABLE TO VERBALIZE 
TO CNA THAT HE WANTED A SLEEPING PILL FOR THE EVENING. NURSING WILL NOTIFY THE MD FOR AN 
ORDER. PATIENT DENIES ANY PAIN OR DISCOMFORT AT THIS TIME. PATIENT REMAINS ON OXYMIZER AT 4 
LITERS. PATIENT WAS BREATHING WITHOUT DISTRESS WITH CHEST EVENLY RISING AND FALLING. NURSING 
NOTED A WET COUGH AND PATIENT USED ORAL YANKER TO SUCTION HIMSELF. MUCUS NOTED WHITISH BROWN 
MINIMAL. SIDE RAILS UP X 2 FOR COMFORT AND SAFETY. CALL LIGHT WITHIN REACH FOR TV 
STIMULATION. NURSING WILL FREQUENT THE ROOM FOR ANTICIPATED NEEDS AND WANTS.  [email or web address]

## 2022-08-29 NOTE — NUR
RECEIVE ENDORSEMENT FROM PM SHIFT NURSE WHILE PATIENT IN STABLE CONDITION W/ NO ACUTE 
DISTRESS NOTED.  PIV R. FOREARM SALINE LOCK; WILL CONTINUE TO MONITOR

## 2022-08-29 NOTE — NUR
PATIENT'S PLAN OF CARE WAS DISCUSSED AND REVIEWED WITH AGUILA FLEMING, CALL LIGHT IS WITHIN THE 
REACH, WILL CONTINUE TO MONITOR PATIENT.

## 2022-08-29 NOTE — NUR
ULTRASOUND GUIDED THORACENTESIS DONE WITH 950ML FLUID REMOVED.  PATIENT TOLERATE PROCEDURE.  
SHORTLY AFTER THORACENTESIS COMPLETE, DR. MAURER VISITED PATIENT AND DID WOUND CARE, THEN, 
HEMODIALYSIS START. PER DIALYSIS NURSE THAT 2 LITER REMOVE TODAY.  WILL CONTINUE TO MONITOR.

-------------------------------------------------------------------------------

Addendum: 09/02/22 at 1527 by Helen Escobar RN

-------------------------------------------------------------------------------

DC PLANNING:

PATIENT WAS TRANSFERRED TO ICU INTUBATED SEDATED FIO2 35% ON PROPOFOL DRIP AND FLAGYL IV 
ABX. S/P BRONCHOSCOPY INSERTED CHEST TUBE. ON HEMODIALYSIS. CARDIO,ID NEPHRO, SURGEON AND 
PULMO FOLLOWING. CM TO FOLLOW

## 2022-08-29 NOTE — NUR
HEMODIALYSIS PROCEDURE IN PROGRESS SATURATION 98% ON FIO2 OF 4 LPM VIA OXYMIZER POST HHN 
THERAPY TITRATED FIO2 TO 3 LPM

## 2022-08-30 VITALS — SYSTOLIC BLOOD PRESSURE: 151 MMHG | DIASTOLIC BLOOD PRESSURE: 76 MMHG

## 2022-08-30 VITALS — DIASTOLIC BLOOD PRESSURE: 66 MMHG | SYSTOLIC BLOOD PRESSURE: 140 MMHG

## 2022-08-30 VITALS — SYSTOLIC BLOOD PRESSURE: 133 MMHG | DIASTOLIC BLOOD PRESSURE: 55 MMHG

## 2022-08-30 VITALS — DIASTOLIC BLOOD PRESSURE: 92 MMHG | SYSTOLIC BLOOD PRESSURE: 146 MMHG

## 2022-08-30 LAB
ALBUMIN FLD-MCNC: 1.8 G/DL (ref 3.4–5)
ANION GAP SERPL CALCULATED.3IONS-SCNC: 15.6 MMOL/L (ref 8–16)
ANION GAP SERPL CALCULATED.3IONS-SCNC: 17.7 MMOL/L (ref 8–16)
AST SERPL-CCNC: 22 U/L (ref 15–37)
BASOPHILS # BLD AUTO: 0.1 K/UL (ref 0–0.22)
BASOPHILS NFR BLD AUTO: 0.7 % (ref 0–2)
BILIRUB SERPL-MCNC: 1.3 MG/DL (ref 0–1)
BUN SERPL-MCNC: 38 MG/DL (ref 7–18)
BUN SERPL-MCNC: 42 MG/DL (ref 7–18)
CHLORIDE SERPL-SCNC: 98 MMOL/L (ref 98–107)
CHLORIDE SERPL-SCNC: 98 MMOL/L (ref 98–107)
CO2 SERPL-SCNC: 25 MMOL/L (ref 21–32)
CO2 SERPL-SCNC: 27 MMOL/L (ref 21–32)
CREAT SERPL-MCNC: 3.6 MG/DL (ref 0.6–1.3)
CREAT SERPL-MCNC: 4.1 MG/DL (ref 0.6–1.3)
EOSINOPHIL # BLD AUTO: 0.3 K/UL (ref 0–0.4)
EOSINOPHIL NFR BLD AUTO: 2 % (ref 0–4)
EOSINOPHIL NFR BLD MANUAL: 1 % (ref 0–4)
ERYTHROCYTE [DISTWIDTH] IN BLOOD BY AUTOMATED COUNT: 17.5 % (ref 11.6–13.7)
ERYTHROCYTE [DISTWIDTH] IN BLOOD BY AUTOMATED COUNT: 18.2 % (ref 11.6–13.7)
GFR SERPL CREATININE-BSD FRML MDRD: 19 ML/MIN (ref 90–?)
GFR SERPL CREATININE-BSD FRML MDRD: 22 ML/MIN (ref 90–?)
GLUCOSE SERPL-MCNC: 131 MG/DL (ref 74–106)
GLUCOSE SERPL-MCNC: 131 MG/DL (ref 74–106)
HCT VFR BLD AUTO: 25 % (ref 36–52)
HCT VFR BLD AUTO: 28.7 % (ref 36–52)
HGB BLD-MCNC: 8.2 G/DL (ref 12–18)
HGB BLD-MCNC: 9.4 G/DL (ref 12–18)
LYMPHOCYTES # BLD AUTO: 0.7 K/UL (ref 2–11.5)
LYMPHOCYTES NFR BLD AUTO: 4.7 % (ref 20.5–51.1)
LYMPHOCYTES NFR BLD MANUAL: 2 % (ref 20–46)
MAGNESIUM SERPL-MCNC: 2 MG/DL (ref 1.8–2.4)
MCH RBC QN AUTO: 30 PG (ref 27–31)
MCH RBC QN AUTO: 30 PG (ref 27–31)
MCHC RBC AUTO-ENTMCNC: 33 G/DL (ref 33–37)
MCHC RBC AUTO-ENTMCNC: 33 G/DL (ref 33–37)
MCV RBC AUTO: 90.8 FL (ref 80–94)
MCV RBC AUTO: 91.7 FL (ref 80–94)
METAMYELOCYTES NFR BLD MANUAL: 2 % (ref 0–0)
MONOCYTES # BLD AUTO: 1 K/UL (ref 0.8–1)
MONOCYTES NFR BLD AUTO: 6.4 % (ref 1.7–9.3)
MONOCYTES NFR BLD MANUAL: 7 % (ref 5–12)
NEUTROPHILS # BLD AUTO: 13.5 K/UL (ref 1.8–7.7)
NEUTROPHILS NFR BLD AUTO: 86.2 % (ref 42.2–75.2)
PHOSPHATE SERPL-MCNC: 4.8 MG/DL (ref 2.5–4.9)
PLATELET # BLD AUTO: 343 K/UL (ref 140–450)
PLATELET # BLD AUTO: 359 K/UL (ref 140–450)
POTASSIUM SERPL-SCNC: 3.6 MMOL/L (ref 3.5–5.1)
POTASSIUM SERPL-SCNC: 3.7 MMOL/L (ref 3.5–5.1)
RBC # BLD AUTO: 2.76 MIL/UL (ref 4.2–6.1)
RBC # BLD AUTO: 3.13 MIL/UL (ref 4.2–6.1)
SODIUM SERPL-SCNC: 137 MMOL/L (ref 136–145)
SODIUM SERPL-SCNC: 137 MMOL/L (ref 136–145)
WBC # BLD AUTO: 15.6 K/UL (ref 4.8–10.8)
WBC # BLD AUTO: 25 K/UL (ref 4.8–10.8)

## 2022-08-30 RX ADMIN — ACETAMINOPHEN PRN MG: 325 TABLET ORAL at 16:41

## 2022-08-30 RX ADMIN — IPRATROPIUM BROMIDE AND ALBUTEROL SULFATE SCH ML: .5; 3 SOLUTION RESPIRATORY (INHALATION) at 13:00

## 2022-08-30 RX ADMIN — Medication SCH DEV: at 11:47

## 2022-08-30 RX ADMIN — PANTOPRAZOLE SODIUM SCH MG: 40 INJECTION, POWDER, FOR SOLUTION INTRAVENOUS at 08:31

## 2022-08-30 RX ADMIN — IPRATROPIUM BROMIDE AND ALBUTEROL SULFATE SCH ML: .5; 3 SOLUTION RESPIRATORY (INHALATION) at 19:00

## 2022-08-30 RX ADMIN — Medication SCH DEV: at 21:32

## 2022-08-30 RX ADMIN — METRONIDAZOLE SCH MLS/HR: 500 SOLUTION INTRAVENOUS at 06:03

## 2022-08-30 RX ADMIN — Medication SCH GM: at 13:34

## 2022-08-30 RX ADMIN — Medication SCH DEV: at 06:58

## 2022-08-30 RX ADMIN — Medication SCH TAB: at 08:31

## 2022-08-30 RX ADMIN — Medication SCH DEV: at 16:12

## 2022-08-30 RX ADMIN — INSULIN LISPRO PRN UNITS: 100 INJECTION, SOLUTION INTRAVENOUS; SUBCUTANEOUS at 11:49

## 2022-08-30 RX ADMIN — METRONIDAZOLE SCH MLS/HR: 500 SOLUTION INTRAVENOUS at 13:00

## 2022-08-30 RX ADMIN — IPRATROPIUM BROMIDE AND ALBUTEROL SULFATE SCH ML: .5; 3 SOLUTION RESPIRATORY (INHALATION) at 07:00

## 2022-08-30 RX ADMIN — METRONIDAZOLE SCH MLS/HR: 500 SOLUTION INTRAVENOUS at 21:33

## 2022-08-30 NOTE — NUR
ENDORSED PATIENT TO KACIE WINN FOR CONTINUITY OF CARE, PATIENT WAS STABLE DURING THE CHANGE 
OF SHIFT. [email or web address]

## 2022-08-30 NOTE — NUR
PATIENT REMAINS ASLEEP WHILE BLOOD DRAW. NO NOTED S/S OF PAIN/DISCOMFORT. NO NOTED 
RESPIRATORY DISTRESS. PATIENT HEAD OF BED ELEVATED TO IMPROVED BREATHING. BED AT THE LOWEST 
LEVEL. CALL LIGHT WITHIN REACH. PATIENT IS BLIND SO NURSING WILL FREQUENT THE ROOM FOR 
ASSISTANCE. FAMILY STILL AT BEDSIDE PER THE DIRECTOR OF [facility]. [email or web address]

## 2022-08-30 NOTE — NUR
RT NOTES 



@1815 CODE BLUE CALLED FOR PT ONCE ARRIVED TO BEDSIDE DR. MENDOZA WAS AT BEDSIDE CHANGED CODE 
TO RAPID RESPONSE. PT ALTERED BUT RESPONSIVE. DR MENDOZA ORDERED BIPAP FOR PT. WILL CONTINUE 
TO MONITOR.

## 2022-08-30 NOTE — NUR
PATIENT IN BED ASLEEP WITH FAMILY MEMBER AT BEDSIDE. PATIENT HAS NO NOTED S/S OF 
PAIN/DISCOMFORT. NO NOTED RESPIRATORY DISTRESS. HEAD OF BED ELEVATED TO IMPROVE BREATHING 
EFFORTS. SIDE RAILS UP X 2 FOR COMFORT. CALL LIGHT WITHIN REACH BUT NURSING FREQUENTS THE 
ROOM FOR ANTICIPATED NEEDS AND WANTS. [email or web address]

## 2022-08-30 NOTE — NUR
PATIENT IN BED ASLEEP. NO NOTED PAIN. NO NOTED RESPIRATORY DISTRESS. BREATHING EVENLY WITH 
CHEST RISING AND FALLING. NO NOTED COUGH. SIDE RAILS UP X 2. CALL LIGHT WITH  IN REACH FOR 
TV LISTENING. PATIENT IS BLIND AND NURSING WILL FREQUENT THE ROOM ON ANTICIPATION FOR NEEDS 
AND WANTS. [email or web address]

## 2022-08-30 NOTE — NUR
DESTINEY MARKS CALLED ME AFTER BED BATH, PT WAS UNRESPONSIVE. ON SITE, PT HAD NO PULSE. CODE 
BLUE WAS CALLED, CODE TEAM ARRIVED ON SCENE. AFTER 1X COMPRESSION GIVEN, PT WOKE UP AND WAS 
SLOWLY RESPONDING. PT HAD EPISODES OF AGONAL BREATHING, PT PLACED ON BIPAP. DR. SARKAR 
NOTIFIED, NEW ORDERS RECEIVED AND MD TO SEE PT. FAMILY WAS CALLED AND UPDATED. PT IN STABLE 
CONDITION AND RESPONSIVE. PT DENIES ANY PAIN OR DISTRESS, V/S WNL, TELE SHOWS PT AT SINUS 
RHYTHM 100 HR. SPO2 100% ON BIPAP. BLOOD SUGAR 130.

## 2022-08-30 NOTE — NUR
RECEIVED REPORT FROM NIGHTSHIFT NURSE AGUILA FOR CONTINUITY OF CARE. PT IN STABLE CONDITION, 
CURRENTLY SLEEPING AND A/OX3. BREATHING IS EVEN, REGULAR AND SHALLOW ON 4L OXYMIZER. PT IS 
INCONTINENT OF THE BOWEL AND BLADDER. PT HAS NONBLANCHABLE REDNESS ON SACRUM, DRESSING 
CLEAN, DRY, AND INTACT. DRESSING ON LEFT FOOT WOUND IS ALSO CLEAN DRY, AND INTACT. DRESSING 
ON BACK FROM THORACENTESIS INTACT, WITH MINOR YELLOW DRAINAGE. PT DENIES PAIN AT THIS TIME, 
NO DISTRESS NOTED. FAMILY AT THE BEDSIDE.

## 2022-08-31 VITALS — DIASTOLIC BLOOD PRESSURE: 54 MMHG | SYSTOLIC BLOOD PRESSURE: 122 MMHG

## 2022-08-31 VITALS — SYSTOLIC BLOOD PRESSURE: 148 MMHG | DIASTOLIC BLOOD PRESSURE: 62 MMHG

## 2022-08-31 VITALS — DIASTOLIC BLOOD PRESSURE: 71 MMHG | SYSTOLIC BLOOD PRESSURE: 144 MMHG

## 2022-08-31 VITALS — DIASTOLIC BLOOD PRESSURE: 75 MMHG | SYSTOLIC BLOOD PRESSURE: 151 MMHG

## 2022-08-31 VITALS — SYSTOLIC BLOOD PRESSURE: 133 MMHG | DIASTOLIC BLOOD PRESSURE: 59 MMHG

## 2022-08-31 VITALS — SYSTOLIC BLOOD PRESSURE: 137 MMHG | DIASTOLIC BLOOD PRESSURE: 66 MMHG

## 2022-08-31 LAB
ANION GAP SERPL CALCULATED.3IONS-SCNC: 15.5 MMOL/L (ref 8–16)
BASOPHILS # BLD AUTO: 0.1 K/UL (ref 0–0.22)
BASOPHILS NFR BLD AUTO: 0.5 % (ref 0–2)
BUN SERPL-MCNC: 47 MG/DL (ref 7–18)
CHLORIDE SERPL-SCNC: 98 MMOL/L (ref 98–107)
CO2 SERPL-SCNC: 27.2 MMOL/L (ref 21–32)
CREAT SERPL-MCNC: 4.5 MG/DL (ref 0.6–1.3)
EOSINOPHIL # BLD AUTO: 0.1 K/UL (ref 0–0.4)
EOSINOPHIL NFR BLD AUTO: 0.7 % (ref 0–4)
ERYTHROCYTE [DISTWIDTH] IN BLOOD BY AUTOMATED COUNT: 17.6 % (ref 11.6–13.7)
GFR SERPL CREATININE-BSD FRML MDRD: 17 ML/MIN (ref 90–?)
GLUCOSE SERPL-MCNC: 131 MG/DL (ref 74–106)
HCT VFR BLD AUTO: 25.4 % (ref 36–52)
HGB BLD-MCNC: 8.2 G/DL (ref 12–18)
LYMPHOCYTES # BLD AUTO: 0.9 K/UL (ref 2–11.5)
LYMPHOCYTES NFR BLD AUTO: 4.5 % (ref 20.5–51.1)
MCH RBC QN AUTO: 30 PG (ref 27–31)
MCHC RBC AUTO-ENTMCNC: 33 G/DL (ref 33–37)
MCV RBC AUTO: 91 FL (ref 80–94)
MONOCYTES # BLD AUTO: 1.4 K/UL (ref 0.8–1)
MONOCYTES NFR BLD AUTO: 6.9 % (ref 1.7–9.3)
NEUTROPHILS # BLD AUTO: 17.5 K/UL (ref 1.8–7.7)
NEUTROPHILS NFR BLD AUTO: 87.4 % (ref 42.2–75.2)
PLATELET # BLD AUTO: 331 K/UL (ref 140–450)
POTASSIUM SERPL-SCNC: 3.7 MMOL/L (ref 3.5–5.1)
RBC # BLD AUTO: 2.79 MIL/UL (ref 4.2–6.1)
SODIUM SERPL-SCNC: 137 MMOL/L (ref 136–145)
WBC # BLD AUTO: 19.9 K/UL (ref 4.8–10.8)

## 2022-08-31 PROCEDURE — 5A1D70Z PERFORMANCE OF URINARY FILTRATION, INTERMITTENT, LESS THAN 6 HOURS PER DAY: ICD-10-PCS

## 2022-08-31 RX ADMIN — Medication SCH EA: at 12:59

## 2022-08-31 RX ADMIN — Medication SCH DEV: at 17:29

## 2022-08-31 RX ADMIN — INSULIN LISPRO PRN UNITS: 100 INJECTION, SOLUTION INTRAVENOUS; SUBCUTANEOUS at 17:34

## 2022-08-31 RX ADMIN — EPOETIN ALFA-EPBX SCH UNITS: 10000 INJECTION, SOLUTION INTRAVENOUS; SUBCUTANEOUS at 09:48

## 2022-08-31 RX ADMIN — Medication SCH DEV: at 11:30

## 2022-08-31 RX ADMIN — METRONIDAZOLE SCH MLS/HR: 500 SOLUTION INTRAVENOUS at 12:46

## 2022-08-31 RX ADMIN — GUAIFENESIN AND DEXTROMETHORPHAN PRN ML: 100; 10 SYRUP ORAL at 18:01

## 2022-08-31 RX ADMIN — INSULIN LISPRO PRN UNITS: 100 INJECTION, SOLUTION INTRAVENOUS; SUBCUTANEOUS at 12:42

## 2022-08-31 RX ADMIN — Medication SCH DEV: at 07:27

## 2022-08-31 RX ADMIN — METRONIDAZOLE SCH MLS/HR: 500 SOLUTION INTRAVENOUS at 05:11

## 2022-08-31 RX ADMIN — IPRATROPIUM BROMIDE AND ALBUTEROL SULFATE SCH ML: .5; 3 SOLUTION RESPIRATORY (INHALATION) at 07:15

## 2022-08-31 RX ADMIN — METRONIDAZOLE SCH MLS/HR: 500 SOLUTION INTRAVENOUS at 21:14

## 2022-08-31 RX ADMIN — INSULIN LISPRO PRN UNITS: 100 INJECTION, SOLUTION INTRAVENOUS; SUBCUTANEOUS at 21:28

## 2022-08-31 RX ADMIN — Medication SCH DEV: at 21:20

## 2022-08-31 RX ADMIN — PANTOPRAZOLE SODIUM SCH MG: 40 INJECTION, POWDER, FOR SOLUTION INTRAVENOUS at 09:44

## 2022-08-31 RX ADMIN — Medication SCH TAB: at 09:44

## 2022-08-31 RX ADMIN — IPRATROPIUM BROMIDE AND ALBUTEROL SULFATE SCH ML: .5; 3 SOLUTION RESPIRATORY (INHALATION) at 19:49

## 2022-08-31 NOTE — NUR
ENDORSED PT TO NANI WINN FOR CONTINUITY OF CARE, PT IS STILL HAVING DIALYSIS AND IS STABLE 
AT THIS TIME.

## 2022-08-31 NOTE — NUR
WOUND CARE RE-EVALUATION NOTE:

SKIN ASSESSMENT DONE WITH PRIMARY RN ,WOUND PHOTO OBTAINED. DAUGHTER AND WIFE AT BED SIDE, 
ALL QUESTIONS ANSWERED. SACRALCOCCYX WOUND RESURFACING WITH SURROUNDING NON-BLANCHABLE 
REDNESS RESOLVED, DAUGHTER MADINA ALSO SEEN WOUNDS AND PLEASE THE IMPROVING WITH CURRENT TX. 
POC DISCUSSED WITH PRIMARY RN.



INTEGUMENTARY:

-INCONTINENT ASSOCIATE DERMATITIS (IAD) TO: B/L GROINS, INNER THIGH AND POSTERIOR THIGH TO 
SCROTAL, SKIN PINK AND INTACT

-PRESSURE INJURY STAGE 3, SACROCOCCYX 3X2X0.1CM, BED IS % GRANULATING TISSUE, MOIST, 
NO ODOR, SERENA-WOUND SKIN MOIST, HEALING SCAR TISSUE, SURROUNDING NON-BLANCHABLE REDNESS 
RESOLVED

-PRESSURE INJURY UN-STAGEABLE, LEFT HEEL 5X4CM WOUND BED IS RED 10% BROWN/BLACK SLOUGH 
TISSUE, 90% LIGHT YELLOW SLOUGH TISSUE SMALL AMOUNT SEROUS DRAINAGE, NO ODOR, SERENA WOUND 
DENUDED SKIN WITH SURROUNDING DTI RESOLVED, SKIN MUSHY AND INTACT

## 2022-08-31 NOTE — NUR
RECEIVED PT FROM NIGHT RN, PT IS ASLEEP, LYING ON THE BED WITH SIDE RAILS UP AND CALL LIGHT 
WITHIN REACH, FAMILY ON THE BED SIDE, PT IS ON OXYMIZER AT O2 3L SATURATING AT 97%, PT HAS A 
RIGHT UPPER CHEST EDUARD CATHETER FOR DIALYSIS ACCESS, IV LINE NOTED ON THE RFA G.20 ON 
SALINE LOCK, NO SIGN OF DISTRESS NOTED AND WILL CONTINUE TO MONITOR PT.

## 2022-08-31 NOTE — NUR
GAVE REPORT TO NIGHT SHIFT NURSE BANDAR FOR CONTINUITY OF CARE. PATIENT ON BED ASLEEP. NO 
ADVERSE REACTION NOTED ON IV ANTIBIOTIC. CALL LIGHT WITH IN EASY REACH. WIFE AT  BED SIDE.

## 2022-08-31 NOTE — NUR
PER PATIENT REQUEST REMOVED BI-PAP. PLACED BACK ON 5L OXYMIZER PT IS SATURATING 100%. AND IS 
NO RESPIRATORY DISTRESS. WILL CONTINUE TO MONITOR

## 2022-08-31 NOTE — NUR
PATIENT CHANGE AND MAKE COMFORTABLE ON BED. WIFE AT BED SIDE.  BLOOD SUGAR CHECKED GIVEN 
INSULIN 2 UNITS FOR BLOOD SUGAR  AND GIVEN HYDRALAZINE AS ORDER. CALL LIGHT WITH IN 
EASY REACH.

## 2022-09-01 VITALS — SYSTOLIC BLOOD PRESSURE: 132 MMHG | DIASTOLIC BLOOD PRESSURE: 64 MMHG

## 2022-09-01 VITALS — DIASTOLIC BLOOD PRESSURE: 72 MMHG | SYSTOLIC BLOOD PRESSURE: 136 MMHG

## 2022-09-01 VITALS — SYSTOLIC BLOOD PRESSURE: 150 MMHG | DIASTOLIC BLOOD PRESSURE: 77 MMHG

## 2022-09-01 VITALS — DIASTOLIC BLOOD PRESSURE: 54 MMHG | SYSTOLIC BLOOD PRESSURE: 126 MMHG

## 2022-09-01 VITALS — DIASTOLIC BLOOD PRESSURE: 71 MMHG | SYSTOLIC BLOOD PRESSURE: 138 MMHG

## 2022-09-01 VITALS — SYSTOLIC BLOOD PRESSURE: 149 MMHG | DIASTOLIC BLOOD PRESSURE: 76 MMHG

## 2022-09-01 LAB
ALBUMIN FLD-MCNC: 1.7 G/DL (ref 3.4–5)
ANION GAP SERPL CALCULATED.3IONS-SCNC: 12.7 MMOL/L (ref 8–16)
AST SERPL-CCNC: 27 U/L (ref 15–37)
BASOPHILS # BLD AUTO: 0.1 K/UL (ref 0–0.22)
BASOPHILS NFR BLD AUTO: 0.9 % (ref 0–2)
BILIRUB SERPL-MCNC: 1.1 MG/DL (ref 0–1)
BUN SERPL-MCNC: 34 MG/DL (ref 7–18)
CHLORIDE SERPL-SCNC: 100 MMOL/L (ref 98–107)
CO2 SERPL-SCNC: 27.1 MMOL/L (ref 21–32)
CREAT SERPL-MCNC: 3.4 MG/DL (ref 0.6–1.3)
EOSINOPHIL # BLD AUTO: 0.4 K/UL (ref 0–0.4)
EOSINOPHIL NFR BLD AUTO: 2.5 % (ref 0–4)
ERYTHROCYTE [DISTWIDTH] IN BLOOD BY AUTOMATED COUNT: 17.7 % (ref 11.6–13.7)
GFR SERPL CREATININE-BSD FRML MDRD: 24 ML/MIN (ref 90–?)
GLUCOSE SERPL-MCNC: 182 MG/DL (ref 74–106)
HCT VFR BLD AUTO: 26.8 % (ref 36–52)
HGB BLD-MCNC: 8.8 G/DL (ref 12–18)
LYMPHOCYTES # BLD AUTO: 1 K/UL (ref 2–11.5)
LYMPHOCYTES NFR BLD AUTO: 6.7 % (ref 20.5–51.1)
MCH RBC QN AUTO: 30 PG (ref 27–31)
MCHC RBC AUTO-ENTMCNC: 33 G/DL (ref 33–37)
MCV RBC AUTO: 91.9 FL (ref 80–94)
MONOCYTES # BLD AUTO: 1.3 K/UL (ref 0.8–1)
MONOCYTES NFR BLD AUTO: 9 % (ref 1.7–9.3)
NEUTROPHILS # BLD AUTO: 11.8 K/UL (ref 1.8–7.7)
NEUTROPHILS NFR BLD AUTO: 80.9 % (ref 42.2–75.2)
PLATELET # BLD AUTO: 371 K/UL (ref 140–450)
POTASSIUM SERPL-SCNC: 3.8 MMOL/L (ref 3.5–5.1)
RBC # BLD AUTO: 2.92 MIL/UL (ref 4.2–6.1)
SODIUM SERPL-SCNC: 136 MMOL/L (ref 136–145)
WBC # BLD AUTO: 14.5 K/UL (ref 4.8–10.8)

## 2022-09-01 RX ADMIN — Medication SCH DEV: at 11:54

## 2022-09-01 RX ADMIN — Medication SCH EA: at 13:12

## 2022-09-01 RX ADMIN — Medication SCH TAB: at 10:03

## 2022-09-01 RX ADMIN — IPRATROPIUM BROMIDE AND ALBUTEROL SULFATE SCH ML: .5; 3 SOLUTION RESPIRATORY (INHALATION) at 15:10

## 2022-09-01 RX ADMIN — Medication SCH DEV: at 20:49

## 2022-09-01 RX ADMIN — METRONIDAZOLE SCH MLS/HR: 500 SOLUTION INTRAVENOUS at 20:35

## 2022-09-01 RX ADMIN — INSULIN LISPRO PRN UNITS: 100 INJECTION, SOLUTION INTRAVENOUS; SUBCUTANEOUS at 11:55

## 2022-09-01 RX ADMIN — METRONIDAZOLE SCH MLS/HR: 500 SOLUTION INTRAVENOUS at 04:44

## 2022-09-01 RX ADMIN — Medication SCH DEV: at 06:34

## 2022-09-01 RX ADMIN — INSULIN LISPRO PRN UNITS: 100 INJECTION, SOLUTION INTRAVENOUS; SUBCUTANEOUS at 20:51

## 2022-09-01 RX ADMIN — PANTOPRAZOLE SODIUM SCH MG: 40 INJECTION, POWDER, FOR SOLUTION INTRAVENOUS at 09:52

## 2022-09-01 RX ADMIN — Medication SCH DEV: at 16:40

## 2022-09-01 RX ADMIN — METRONIDAZOLE SCH MLS/HR: 500 SOLUTION INTRAVENOUS at 13:12

## 2022-09-01 RX ADMIN — IPRATROPIUM BROMIDE AND ALBUTEROL SULFATE SCH ML: .5; 3 SOLUTION RESPIRATORY (INHALATION) at 19:00

## 2022-09-01 RX ADMIN — IPRATROPIUM BROMIDE AND ALBUTEROL SULFATE SCH ML: .5; 3 SOLUTION RESPIRATORY (INHALATION) at 07:00

## 2022-09-01 NOTE — NUR
RECEIVED REPORT FROM AM NURSE OSEI. PATIENT IN BED SLEEPING HEAD OF BED ELEVATED WITH O2 ON. 
NO S/S OF RESPIRATORY DISTRESS. BREATHING NORMAL UNLABORED. DIALYSIS ACCESS ON THE RIGHT 
UPPER CHEST EDUARD CATHETER. IV ACCESS ON THE RIGHT FOREARM INTACT AND PATENT. ALL SAFETY 
PRECAUTIONS ARE IN PLACE. WHEELS OF BED LOCKED. WIFE AT BEDSIDE. WILL CONTINUE TO MONITOR .

## 2022-09-01 NOTE — NUR
RECEIVE ENDORSEMENT FROM PM SHIFT NURSE THAT PATIENT REST IN BED, PIV R. FOREARM SALINE 
LOCK, DIALYSIS TUNNEL/EDUARD CATHETER R. UPPER CHEST.  WILL CONTINUE TO MONITOR

## 2022-09-01 NOTE — NUR
PATIENT IS PLACE ON NPO FOR LUNCH D/T US CHEST PENDING.  PATIENT IS NOT VERY AWAKE, SLEEPING 
MOST TIME.  WILL CONTINUE TO MONITOR.

## 2022-09-01 NOTE — NUR
9/1/22 RD FOLLOW UP COMPLETED



PLEASE REFER TO NUTRITION ASSESSMENT UNDER CARE ACTIVITY FOR ESTIMATED NUTRITIONAL NEEDS. 



1. CONTINUE CHO 60 GM + RENAL DIET WITH MECHANICAL SOFT TEXTURE MODIFICATION AS TOLERATED. 

- PROSOURCE BID FOR NUTRITION THERAPY/WOUND HEALING.

2.RECOMMEND NEPRO TID 

3. MONITOR PO INTAKE 

4. RD TO FOLLOW-UP 3-5 DAYS, MODERATE RISK



REVIEWED BY DARRYL STEWART RD

## 2022-09-01 NOTE — NUR
ENDORSE PATIENT TO PM SHIFT NURSE THAT PATIENT REST IN BED, PIV R. FOREARM SALINE LOCK, 
DIALYSIS TUNNEL/EDUARD CATHETER R. UPPER CHEST. PATIETN JUST RETURN FROM CHEST CT.  CHEST 
US DON IN THE AFTERNOON

## 2022-09-02 VITALS — DIASTOLIC BLOOD PRESSURE: 68 MMHG | SYSTOLIC BLOOD PRESSURE: 150 MMHG

## 2022-09-02 VITALS — DIASTOLIC BLOOD PRESSURE: 68 MMHG | SYSTOLIC BLOOD PRESSURE: 147 MMHG

## 2022-09-02 VITALS — SYSTOLIC BLOOD PRESSURE: 146 MMHG | DIASTOLIC BLOOD PRESSURE: 64 MMHG

## 2022-09-02 VITALS — DIASTOLIC BLOOD PRESSURE: 69 MMHG | SYSTOLIC BLOOD PRESSURE: 162 MMHG

## 2022-09-02 VITALS — SYSTOLIC BLOOD PRESSURE: 158 MMHG | DIASTOLIC BLOOD PRESSURE: 79 MMHG

## 2022-09-02 VITALS — SYSTOLIC BLOOD PRESSURE: 145 MMHG | DIASTOLIC BLOOD PRESSURE: 66 MMHG

## 2022-09-02 VITALS — SYSTOLIC BLOOD PRESSURE: 154 MMHG | DIASTOLIC BLOOD PRESSURE: 66 MMHG

## 2022-09-02 VITALS — SYSTOLIC BLOOD PRESSURE: 157 MMHG | DIASTOLIC BLOOD PRESSURE: 69 MMHG

## 2022-09-02 VITALS — SYSTOLIC BLOOD PRESSURE: 129 MMHG | DIASTOLIC BLOOD PRESSURE: 65 MMHG

## 2022-09-02 VITALS — DIASTOLIC BLOOD PRESSURE: 65 MMHG | SYSTOLIC BLOOD PRESSURE: 129 MMHG

## 2022-09-02 VITALS — DIASTOLIC BLOOD PRESSURE: 65 MMHG | SYSTOLIC BLOOD PRESSURE: 142 MMHG

## 2022-09-02 VITALS — DIASTOLIC BLOOD PRESSURE: 62 MMHG | SYSTOLIC BLOOD PRESSURE: 124 MMHG

## 2022-09-02 VITALS — SYSTOLIC BLOOD PRESSURE: 146 MMHG | DIASTOLIC BLOOD PRESSURE: 66 MMHG

## 2022-09-02 VITALS — SYSTOLIC BLOOD PRESSURE: 185 MMHG | DIASTOLIC BLOOD PRESSURE: 92 MMHG

## 2022-09-02 VITALS — DIASTOLIC BLOOD PRESSURE: 72 MMHG | SYSTOLIC BLOOD PRESSURE: 154 MMHG

## 2022-09-02 VITALS — DIASTOLIC BLOOD PRESSURE: 89 MMHG | SYSTOLIC BLOOD PRESSURE: 159 MMHG

## 2022-09-02 VITALS — DIASTOLIC BLOOD PRESSURE: 73 MMHG | SYSTOLIC BLOOD PRESSURE: 151 MMHG

## 2022-09-02 VITALS — DIASTOLIC BLOOD PRESSURE: 92 MMHG | SYSTOLIC BLOOD PRESSURE: 184 MMHG

## 2022-09-02 VITALS — DIASTOLIC BLOOD PRESSURE: 72 MMHG | SYSTOLIC BLOOD PRESSURE: 164 MMHG

## 2022-09-02 VITALS — SYSTOLIC BLOOD PRESSURE: 165 MMHG | DIASTOLIC BLOOD PRESSURE: 83 MMHG

## 2022-09-02 VITALS — DIASTOLIC BLOOD PRESSURE: 80 MMHG | SYSTOLIC BLOOD PRESSURE: 157 MMHG

## 2022-09-02 VITALS — SYSTOLIC BLOOD PRESSURE: 132 MMHG | DIASTOLIC BLOOD PRESSURE: 78 MMHG

## 2022-09-02 VITALS — DIASTOLIC BLOOD PRESSURE: 71 MMHG | SYSTOLIC BLOOD PRESSURE: 152 MMHG

## 2022-09-02 VITALS — DIASTOLIC BLOOD PRESSURE: 66 MMHG | SYSTOLIC BLOOD PRESSURE: 149 MMHG

## 2022-09-02 LAB
ANION GAP SERPL CALCULATED.3IONS-SCNC: 17.1 MMOL/L (ref 8–16)
APPEARANCE FLD: CLEAR
APPEARANCE SPUN FLD: CLEAR
BODY FLD TYPE: (no result)
BUN SERPL-MCNC: 40 MG/DL (ref 7–18)
CHLORIDE SERPL-SCNC: 98 MMOL/L (ref 98–107)
CO2 SERPL-SCNC: 25.4 MMOL/L (ref 21–32)
COLOR FLD: YELLOW
CREAT SERPL-MCNC: 4 MG/DL (ref 0.6–1.3)
ERYTHROCYTE [DISTWIDTH] IN BLOOD BY AUTOMATED COUNT: 17.5 % (ref 11.6–13.7)
GFR SERPL CREATININE-BSD FRML MDRD: 20 ML/MIN (ref 90–?)
GLUCOSE FLD-MCNC: 111 MG/DL
GLUCOSE SERPL-MCNC: 162 MG/DL (ref 74–106)
HCT VFR BLD AUTO: 28 % (ref 36–52)
HGB BLD-MCNC: 9.1 G/DL (ref 12–18)
MCH RBC QN AUTO: 30 PG (ref 27–31)
MCHC RBC AUTO-ENTMCNC: 33 G/DL (ref 33–37)
MCV RBC AUTO: 91.7 FL (ref 80–94)
NEUTROPHILS NFR FLD MANUAL: 0 %
PLATELET # BLD AUTO: 381 K/UL (ref 140–450)
POTASSIUM SERPL-SCNC: 4.5 MMOL/L (ref 3.5–5.1)
PROTHROMBIN TIME: 12 SECS (ref 10.8–13.4)
RBC # BLD AUTO: 3.05 MIL/UL (ref 4.2–6.1)
RBC # FLD MANUAL: (no result) /CU. MM.
SODIUM SERPL-SCNC: 136 MMOL/L (ref 136–145)
SPECIMEN VOL FLD: 11 ML
WBC # BLD AUTO: 16.3 K/UL (ref 4.8–10.8)
WBC # FLD MANUAL: 0 /CU. MM.

## 2022-09-02 PROCEDURE — 0BH17EZ INSERTION OF ENDOTRACHEAL AIRWAY INTO TRACHEA, VIA NATURAL OR ARTIFICIAL OPENING: ICD-10-PCS

## 2022-09-02 PROCEDURE — 0B9D8ZX DRAINAGE OF RIGHT MIDDLE LUNG LOBE, VIA NATURAL OR ARTIFICIAL OPENING ENDOSCOPIC, DIAGNOSTIC: ICD-10-PCS

## 2022-09-02 PROCEDURE — 0W9B30Z DRAINAGE OF LEFT PLEURAL CAVITY WITH DRAINAGE DEVICE, PERCUTANEOUS APPROACH: ICD-10-PCS

## 2022-09-02 PROCEDURE — 5A1D70Z PERFORMANCE OF URINARY FILTRATION, INTERMITTENT, LESS THAN 6 HOURS PER DAY: ICD-10-PCS

## 2022-09-02 PROCEDURE — 5A1955Z RESPIRATORY VENTILATION, GREATER THAN 96 CONSECUTIVE HOURS: ICD-10-PCS

## 2022-09-02 RX ADMIN — Medication SCH DEV: at 07:58

## 2022-09-02 RX ADMIN — Medication SCH TAB: at 08:16

## 2022-09-02 RX ADMIN — Medication SCH DEV: at 11:53

## 2022-09-02 RX ADMIN — SODIUM CHLORIDE PRN MG: 9 INJECTION, SOLUTION INTRAVENOUS at 06:28

## 2022-09-02 RX ADMIN — PANTOPRAZOLE SODIUM SCH MG: 40 INJECTION, POWDER, FOR SOLUTION INTRAVENOUS at 08:13

## 2022-09-02 RX ADMIN — SODIUM CHLORIDE SCH MLS/HR: 9 INJECTION, SOLUTION INTRAVENOUS at 21:00

## 2022-09-02 RX ADMIN — PROPOFOL PRN MLS/HR: 10 INJECTION, EMULSION INTRAVENOUS at 21:46

## 2022-09-02 RX ADMIN — PROPOFOL PRN MLS/HR: 10 INJECTION, EMULSION INTRAVENOUS at 06:32

## 2022-09-02 RX ADMIN — EPOETIN ALFA-EPBX SCH UNITS: 10000 INJECTION, SOLUTION INTRAVENOUS; SUBCUTANEOUS at 08:14

## 2022-09-02 RX ADMIN — Medication SCH EA: at 13:57

## 2022-09-02 RX ADMIN — IPRATROPIUM BROMIDE AND ALBUTEROL SULFATE SCH ML: .5; 3 SOLUTION RESPIRATORY (INHALATION) at 19:27

## 2022-09-02 RX ADMIN — ACETAMINOPHEN PRN MG: 325 TABLET ORAL at 12:00

## 2022-09-02 RX ADMIN — PROPOFOL PRN MLS/HR: 10 INJECTION, EMULSION INTRAVENOUS at 07:00

## 2022-09-02 RX ADMIN — METRONIDAZOLE SCH MLS/HR: 500 SOLUTION INTRAVENOUS at 05:00

## 2022-09-02 RX ADMIN — PROPOFOL PRN MLS/HR: 10 INJECTION, EMULSION INTRAVENOUS at 12:40

## 2022-09-02 RX ADMIN — Medication SCH DEV: at 23:55

## 2022-09-02 RX ADMIN — IPRATROPIUM BROMIDE AND ALBUTEROL SULFATE SCH ML: .5; 3 SOLUTION RESPIRATORY (INHALATION) at 08:11

## 2022-09-02 RX ADMIN — METRONIDAZOLE SCH MLS/HR: 500 SOLUTION INTRAVENOUS at 13:57

## 2022-09-02 RX ADMIN — Medication SCH DEV: at 16:28

## 2022-09-02 NOTE — NUR
PATIENT WAS INTUBATED BY DR. CANTU.

-------------------------------------------------------------------------------

Addendum: 09/02/22 at 0455 by Argentina Gunderson RN RN

-------------------------------------------------------------------------------

V/S: 113/64 , 79 , 24 , 98 , 95%.

## 2022-09-02 NOTE — NUR
PT CLEAN AND REPOSITIONED, SMEAR BM NOTICED. PT TOLERATED WELL. VSS. WILL CONTINUE TO CLOSE 
MONITOR.

## 2022-09-02 NOTE — NUR
ASSUMED CARE OF PATIENT

CURRENTLY INTUBATED AND SEDATED

PATIENT IS ON PROPOFOL DRIP RUNNING AT 30MCG/KG/MIN

HAS TUBE FEEDING RUNNING AT 10ML/HR WITH GOAL OF 40M L/HR WITH Q4 HR H20 FLUSH OF 200ML.

CURRENT VENT SETTINGS:

MODE = AC/PRVC

RATE = 20

TIDAL VOLUME = 500

FIO2 = 28

PEEP = 5



PATIENT HAS CHEST TUBE (LEFT) 



PATIENT IS ON RESTRAINTS



RIGHT IJ



RIGHT WRIST PERIPHERAL, 20 G

## 2022-09-02 NOTE — NUR
PHONE CALL RECEIVED FROM PATIENT'S DTRMADINA.

PROVIDED UPDATE AND CURRENT STATUS ON PATIENT 

ALL QUESTIONS ANSWERED.

## 2022-09-02 NOTE — NUR
OBTAINED CONSENT FROM MADINA VIA TELEPHONE AND MICHELLE AT [facility] WITH  PHONE, 
CAROLA, ID #[id number], FOR BRONCOSCOPY, DR SOLIMAN PERFORMED AT BEDSIDE. RT AT BEDSIDE.

## 2022-09-02 NOTE — NUR
PATIENT WAS TRANSFERRED TO ICU, SATING AT 99%, UNRESPONSIVE.

-------------------------------------------------------------------------------

Addendum: 09/02/22 at 0457 by Argentina Gunderson RN RN

-------------------------------------------------------------------------------

REPORT GIVEN TO ICU NURSE FOR CONTINUITY OF CARE.

## 2022-09-02 NOTE — NUR
RESPONDED TO RAPID RESPONSE. PULSE CHECKED. PT HAS A PULSE BUT AGONAL BREATHING. VENTILATION 
AND OXYGENATION WAS INITIATED VIA AMBU BAG BY RT. STEFFANIE CANTU WAS CALLED TO BEDSIDE. PT 
WAS SUCCESSFULLY INTUBATED BY DR. CANTU WITH ETT SIZE 8.0 AND SECURED WITH ANCHOR-FAST AT 
24cm @TEETH. COLOR CHANGE ON CO2 DETECTOR. BILATERAL BREATH SOUNDS ON AUSCULTATION. X'RAY 
WAS ORDERED BY STEFFANIE. VERBAL ORDER FOR SPUTUM AND ABG WAS OBTAINED.  PT WAS THEN TRANSPORTED 
TO ICU. PLACED PT ON VENT SUPPORT. VENT PLUGGED IN RED OUTLET. ALARMS SET AND AUDIBLE. WILL 
CONTINUE TO MONITOR PT.

## 2022-09-02 NOTE — NUR
RECEIVED BEDSIDE REPORT FROM SHILPA SUN RN FOR CONTINUITY OF CARE. PT SEDATED RASS -3, 
SUPINE IN THE BED. ETT TO VENT ACPRVC FIO2 35%  R 20 PEEP 5. COARSE CRACKLES LUNG 
SOUNDS. SR ON BEDSIDE MONITOR. RFA 20G IV PATENT. INFUSING PROPOFOL AT 5 MCG/KG/MIN, AND 
FLAGYL  ML/H. RIJ EDUARD CATH IN PLACE, CLEAN DRY INTACT. AUSCULTATED S 1 AND 2. 
BOWEL SOUNDS ACTIVE, LAST BM 9/1. PT ANURIC. W GENERALIZED WEAKNESS TO BUE BLE. SKIN NOT 
INTACT, SEE WOUND ASSESSMENT. ON STANDARD ISOLATION. SAFETY PRECAUTIONS MET. CALL LIGHT 
WITHIN REACH. INITIAL ASSESSMENT COMPLETE, WILL CONTINUE TO CLOSELY MONITOR.

## 2022-09-02 NOTE — NUR
ABG RESULTS WERE GIVEN TO DR. CANTU. MD AGREED TO INCREASE TIDAL VOLUME FROM 450 . 
CHANGES MADE TO VENT. FIO2 TITRATED FROM 100% TO 40%. PT IN NO RESPIRATORY DISTRESS AT THIS 
TIME. WILL CONTINUE TO MONITOR PT.

## 2022-09-02 NOTE — NUR
TRANS IN FROM TELE THIS 61 YEAR OLD MALE PATIENT; POST RRT, INTUBATED AND VENTILATED. 
ADMITTED IN ICU 33 HOOKED TO CARDIAC MONITOR; SCOPE SHOWS ON SINUS RHYTHM  HR 85/MIN. IV 
PROPOFOL IN PROGRESS AT 5 MCG/KG/MIN VIA G 20 IV CANNULA ON RIGHT ARM. DIALYSIS ACCESS NOTED 
ON RIGHT SUBCLAVIAN; INTACT.

## 2022-09-02 NOTE — NUR
ORAL CARE USING VAP KIT RENDERED.PT ON HEPARIN FOR DVT PROPHYLAXIS AND PROTONIX FOR GI 
PROPHYLAXIS,REPOSITIONED FOR COMFORT

## 2022-09-02 NOTE — NUR
ASSUMED CARE OF PT.INITIAL ASSESSMENT COMPLETED.PT SEDATED.SR NOTED ON MONITOR.ORALLY 
INTUBATED AC PRVC MODE.FIO2 100%  RATE20 PEEP 5' W/HD ACCESS TO RT CHEST W/DRY AND 
INTACT DRESSING.W/PERIPHERAL IV TO RT F/A G20 INTACT(UPPER) INFUSING NS AT 5ML/HR, LOWER RT 
F/A G20 INFUSING PROPOFOL DRIP AT 30MCG/KG/MIN W/DRY WT AT 63.04 KG.W/OGT INTACT, 40ML 
RESIDUAL, ON OGT FEEDING NEPRO AT 10ML/HR AND WATER FLUSH AS ORDERED.WILL INCREASE LATER.PT 
OLIGURIC.W/BILATERAL SOFT WRIST RESTRAINTS,NO S/SX OF INJURIES NOTED.W/ PRESSURE ULCER NOTED 
TO RT HEEL, CLEANSE WITH SALINE AND NON ADHERENT DRESSING AND ABD PAD APPLIED.SACRAL AREA 
WITH OLD SCAR NOTED, FOAM IN PLACE. PT ON SPECIAL MATTRESS.FALL PRECAUTION IN PLACE.CALL 
LIGHT WITHIN REACH.WILL CONTINUE TO CLOSELY MONITOR PT.

-------------------------------------------------------------------------------

Addendum: 09/02/22 at 2206 by Tavia Herrera RN

-------------------------------------------------------------------------------

W/CHEST TUBE TO LT LATERAL CHEST.SMALLL AMT OF YELLOWISH/PINKISH OUTPUT NOTED

## 2022-09-02 NOTE — NUR
HD RN AT BEDSIDE, STARTING DIALYSIS THROUGH RT IJ EDUARD CATHETER. PT DAUGHTER GOPAL AT 
BEDSIDE, UPDATED REGARDING PT CONDITION, ALL QUESTIONS ANSWERED AT THIS TIME.

## 2022-09-02 NOTE — NUR
DR BOYLE ORDERED TO SUCTION THE PATIENT. CALLED RAPID RESPONSE.

-------------------------------------------------------------------------------

Addendum: 09/02/22 at 0450 by Argentina Gunderson RN RN

-------------------------------------------------------------------------------

CALLED RAPID RESPONSE DUE TO PATIENT IS UNRESPONSIVE. O2 SAT 98%.

## 2022-09-02 NOTE — NUR
PATIENT SLEEPING. CHEST RISE AND FALL SYMMETRICALLY. SAFETY MEASURES IN PLACE. CALL LIGHT 
WITHIN REACH.

## 2022-09-02 NOTE — NUR
PT WIFE, MICHELLE AT BEDSIDE, WITH MADINA PT DAUGHTER/DECISION MAKER ON PT WIFE PHONE. 
OBTAINED TELEPHONE CONSENT FROM MADINA FOR CHEST TUBE INSERTION WITH DANIELLA RN WITNESS. 
MICHELLE AGREEABLE.

## 2022-09-03 VITALS — SYSTOLIC BLOOD PRESSURE: 134 MMHG | DIASTOLIC BLOOD PRESSURE: 60 MMHG

## 2022-09-03 VITALS — SYSTOLIC BLOOD PRESSURE: 164 MMHG | DIASTOLIC BLOOD PRESSURE: 74 MMHG

## 2022-09-03 VITALS — SYSTOLIC BLOOD PRESSURE: 158 MMHG | DIASTOLIC BLOOD PRESSURE: 69 MMHG

## 2022-09-03 VITALS — SYSTOLIC BLOOD PRESSURE: 142 MMHG | DIASTOLIC BLOOD PRESSURE: 68 MMHG

## 2022-09-03 VITALS — SYSTOLIC BLOOD PRESSURE: 143 MMHG | DIASTOLIC BLOOD PRESSURE: 62 MMHG

## 2022-09-03 VITALS — DIASTOLIC BLOOD PRESSURE: 70 MMHG | SYSTOLIC BLOOD PRESSURE: 163 MMHG

## 2022-09-03 VITALS — SYSTOLIC BLOOD PRESSURE: 152 MMHG | DIASTOLIC BLOOD PRESSURE: 71 MMHG

## 2022-09-03 VITALS — SYSTOLIC BLOOD PRESSURE: 166 MMHG | DIASTOLIC BLOOD PRESSURE: 69 MMHG

## 2022-09-03 VITALS — SYSTOLIC BLOOD PRESSURE: 146 MMHG | DIASTOLIC BLOOD PRESSURE: 64 MMHG

## 2022-09-03 VITALS — SYSTOLIC BLOOD PRESSURE: 163 MMHG | DIASTOLIC BLOOD PRESSURE: 69 MMHG

## 2022-09-03 VITALS — DIASTOLIC BLOOD PRESSURE: 61 MMHG | SYSTOLIC BLOOD PRESSURE: 133 MMHG

## 2022-09-03 VITALS — DIASTOLIC BLOOD PRESSURE: 68 MMHG | SYSTOLIC BLOOD PRESSURE: 149 MMHG

## 2022-09-03 VITALS — SYSTOLIC BLOOD PRESSURE: 131 MMHG | DIASTOLIC BLOOD PRESSURE: 54 MMHG

## 2022-09-03 VITALS — DIASTOLIC BLOOD PRESSURE: 58 MMHG | SYSTOLIC BLOOD PRESSURE: 149 MMHG

## 2022-09-03 VITALS — DIASTOLIC BLOOD PRESSURE: 71 MMHG | SYSTOLIC BLOOD PRESSURE: 147 MMHG

## 2022-09-03 VITALS — DIASTOLIC BLOOD PRESSURE: 69 MMHG | SYSTOLIC BLOOD PRESSURE: 166 MMHG

## 2022-09-03 VITALS — DIASTOLIC BLOOD PRESSURE: 79 MMHG | SYSTOLIC BLOOD PRESSURE: 147 MMHG

## 2022-09-03 VITALS — DIASTOLIC BLOOD PRESSURE: 73 MMHG | SYSTOLIC BLOOD PRESSURE: 159 MMHG

## 2022-09-03 VITALS — SYSTOLIC BLOOD PRESSURE: 157 MMHG | DIASTOLIC BLOOD PRESSURE: 73 MMHG

## 2022-09-03 VITALS — SYSTOLIC BLOOD PRESSURE: 137 MMHG | DIASTOLIC BLOOD PRESSURE: 63 MMHG

## 2022-09-03 VITALS — SYSTOLIC BLOOD PRESSURE: 157 MMHG | DIASTOLIC BLOOD PRESSURE: 71 MMHG

## 2022-09-03 VITALS — SYSTOLIC BLOOD PRESSURE: 132 MMHG | DIASTOLIC BLOOD PRESSURE: 55 MMHG

## 2022-09-03 VITALS — DIASTOLIC BLOOD PRESSURE: 70 MMHG | SYSTOLIC BLOOD PRESSURE: 169 MMHG

## 2022-09-03 VITALS — SYSTOLIC BLOOD PRESSURE: 159 MMHG | DIASTOLIC BLOOD PRESSURE: 80 MMHG

## 2022-09-03 VITALS — DIASTOLIC BLOOD PRESSURE: 64 MMHG | SYSTOLIC BLOOD PRESSURE: 150 MMHG

## 2022-09-03 VITALS — DIASTOLIC BLOOD PRESSURE: 68 MMHG | SYSTOLIC BLOOD PRESSURE: 147 MMHG

## 2022-09-03 VITALS — DIASTOLIC BLOOD PRESSURE: 73 MMHG | SYSTOLIC BLOOD PRESSURE: 155 MMHG

## 2022-09-03 VITALS — DIASTOLIC BLOOD PRESSURE: 65 MMHG | SYSTOLIC BLOOD PRESSURE: 139 MMHG

## 2022-09-03 VITALS — DIASTOLIC BLOOD PRESSURE: 64 MMHG | SYSTOLIC BLOOD PRESSURE: 141 MMHG

## 2022-09-03 VITALS — SYSTOLIC BLOOD PRESSURE: 153 MMHG | DIASTOLIC BLOOD PRESSURE: 69 MMHG

## 2022-09-03 LAB
ANION GAP SERPL CALCULATED.3IONS-SCNC: 13.3 MMOL/L (ref 8–16)
BUN SERPL-MCNC: 30 MG/DL (ref 7–18)
CHLORIDE SERPL-SCNC: 100 MMOL/L (ref 98–107)
CO2 SERPL-SCNC: 26.5 MMOL/L (ref 21–32)
CREAT SERPL-MCNC: 2.9 MG/DL (ref 0.6–1.3)
EOSINOPHIL NFR BLD MANUAL: 1 % (ref 0–4)
ERYTHROCYTE [DISTWIDTH] IN BLOOD BY AUTOMATED COUNT: 17.8 % (ref 11.6–13.7)
GFR SERPL CREATININE-BSD FRML MDRD: 29 ML/MIN (ref 90–?)
GLUCOSE SERPL-MCNC: 124 MG/DL (ref 74–106)
HCT VFR BLD AUTO: 28 % (ref 36–52)
HGB BLD-MCNC: 9.2 G/DL (ref 12–18)
LYMPHOCYTES NFR BLD MANUAL: 5 % (ref 20–46)
MCH RBC QN AUTO: 30 PG (ref 27–31)
MCHC RBC AUTO-ENTMCNC: 33 G/DL (ref 33–37)
MCV RBC AUTO: 90.6 FL (ref 80–94)
MONOCYTES NFR BLD MANUAL: 5 % (ref 5–12)
PLATELET # BLD AUTO: 334 K/UL (ref 140–450)
POTASSIUM SERPL-SCNC: 3.8 MMOL/L (ref 3.5–5.1)
RBC # BLD AUTO: 3.09 MIL/UL (ref 4.2–6.1)
SODIUM SERPL-SCNC: 136 MMOL/L (ref 136–145)
WBC # BLD AUTO: 23.2 K/UL (ref 4.8–10.8)

## 2022-09-03 RX ADMIN — IPRATROPIUM BROMIDE AND ALBUTEROL SULFATE SCH ML: .5; 3 SOLUTION RESPIRATORY (INHALATION) at 07:21

## 2022-09-03 RX ADMIN — Medication SCH EA: at 13:07

## 2022-09-03 RX ADMIN — Medication SCH TAB: at 08:28

## 2022-09-03 RX ADMIN — IPRATROPIUM BROMIDE AND ALBUTEROL SULFATE SCH ML: .5; 3 SOLUTION RESPIRATORY (INHALATION) at 13:15

## 2022-09-03 RX ADMIN — Medication SCH DEV: at 11:54

## 2022-09-03 RX ADMIN — SODIUM CHLORIDE SCH MLS/HR: 9 INJECTION, SOLUTION INTRAVENOUS at 08:33

## 2022-09-03 RX ADMIN — INSULIN LISPRO PRN UNITS: 100 INJECTION, SOLUTION INTRAVENOUS; SUBCUTANEOUS at 17:01

## 2022-09-03 RX ADMIN — PROPOFOL PRN MLS/HR: 10 INJECTION, EMULSION INTRAVENOUS at 06:49

## 2022-09-03 RX ADMIN — SODIUM CHLORIDE SCH MLS/HR: 9 INJECTION, SOLUTION INTRAVENOUS at 21:24

## 2022-09-03 RX ADMIN — Medication SCH DEV: at 06:34

## 2022-09-03 RX ADMIN — PANTOPRAZOLE SODIUM SCH MG: 40 INJECTION, POWDER, FOR SOLUTION INTRAVENOUS at 08:35

## 2022-09-03 RX ADMIN — IPRATROPIUM BROMIDE AND ALBUTEROL SULFATE SCH ML: .5; 3 SOLUTION RESPIRATORY (INHALATION) at 19:00

## 2022-09-03 RX ADMIN — INSULIN LISPRO PRN UNITS: 100 INJECTION, SOLUTION INTRAVENOUS; SUBCUTANEOUS at 11:59

## 2022-09-03 RX ADMIN — PROPOFOL PRN MLS/HR: 10 INJECTION, EMULSION INTRAVENOUS at 23:09

## 2022-09-03 RX ADMIN — Medication SCH DEV: at 18:50

## 2022-09-03 RX ADMIN — ACETAMINOPHEN PRN MG: 325 TABLET ORAL at 16:25

## 2022-09-03 RX ADMIN — PROPOFOL PRN MLS/HR: 10 INJECTION, EMULSION INTRAVENOUS at 14:17

## 2022-09-03 NOTE — NUR
RECEIVED REPORT FROM DAY SHIFT TATUM BYRD. PT. NOT ALERT AND ORIENTED. CONT. ON ETT A/C 
PRVC RATE 20, FIO2 28%, , PEEP 5. SINUS RHYTHM ON MONITOR. IV TO RIGHT LOWER FOREARM 
20G AND RIGHT PERIPHERAL MIDLINE 20G, SITE NO S/S OF INFILTRATION, INFUSING PROPOFOL DRIP 30 
MCG/KG/MIN AND NS TKO. HAD HEMODIALYSIS YESTERDAY TO RIGHT SUBCLAVIAN EDUARD CATHETER WITH 
2.8L OUTPUT. SCHEDULE FOR CPAP TOMORROW PER MD ORDERED. FEEDING NEPRO 40 ML/HR VIA OGT 
TOLERATING WELL. LEFT CHEST TUBE IN PLACE, DRY AND INTACT. PT. ANURIC. WITH WOUND TO LEFT 
HEEL AND DR. MAURER AWARE, NEW ORDER FOR MEDICATION AND PHARMACY AWARE. REPOSITIONED, CONT. ON 
BILATERAL SOFT WRIST RESTRAINT, NO S/S OF POOR CIRCULATION, NO S/S OF INJURY. PT. NO S/S OF 
PAIN. PROVIDED SAFE AND QUIET ENVIRONMENT. BED LOCK AND PLACED BED IN THE LOWEST HEIGHT. 
WILL CONT. TO MONITOR.

## 2022-09-03 NOTE — NUR
TUBE FEEDING RESIDUAL CHECKED; PATIENT HAS A RESIDUAL OF 60ML.

OKAY TO INCREASE TUBE FEEDING TO 20ML/HR AS PER MD ORDERS.

## 2022-09-03 NOTE — NUR
DR GUERRERO ROUNDING AT BEDSIDE. PT INFORMATION UPDATED. DR GUERRERO ORDERED 2 CHEST X-RAY, 1 
STAT, 1 FOR 9/4 MORNING TO SEE HOW PT PROGRESSED FROM CHEST TUBE DRAINAGE REMOVAL. THEN WILL 
TRY CPAP. KEEP PROPOFOL.

## 2022-09-03 NOTE — NUR
RECEIVED BEDSIDE REPORT FROM ANNIE SUN RN FOR CONTINUITY OF CARE. PT SEDATED RASS -3, 
SUPINE IN THE BED. ETT TO VENT ACPRVC FIO2 28%  R 20 PEEP 5. COARSE CRACKLES LUNG 
SOUNDS. SR ON BEDSIDE MONITOR. RFA 20G IV PATENT. INFUSING PROPOFOL AT 30MCG/KG/MIN. OGT 
INPLACED, PATENT, INFUSING NEPRO 40ML/HR. FREE WASH WATER 200CC Q4HR. CHEST TUBE DRAINAGE 
20CC COLLECTED OVER THE NIGHT. [facility] EDUARD CATH IN PLACE, CLEAN DRY INTACT. AUSCULTATED S 1 
AND 2. BOWEL SOUNDS ACTIVE, LAST BM 9/1. PT ANURIC. W GENERALIZED WEAKNESS TO BUE BLE. SKIN 
NOT INTACT, SEE WOUND ASSESSMENT. ON STANDARD ISOLATION. SAFETY PRECAUTIONS MET. CALL LIGHT 
WITHIN REACH. INITIAL ASSESSMENT COMPLETE, WILL CONTINUE TO CLOSELY MONITOR.

## 2022-09-03 NOTE — NUR
SPOKE SHAVON OLIVIA FROM PHARMACY ABOUT DR MAURER ORDER FOR SANTYL OINTMENT FOR PT L FOOT. HE SAID HE 
WILL ORDER, BUT UNSURE IF IT WILL BE IN STOCK, SOONEST WE WILL HAVE IT OR KNOW IS TUESDAY 9/6

## 2022-09-03 NOTE — NUR
TUBE FEEDING RESIDUAL CHECKED; PATIENT HAS A RESIDUAL OF 10ML.

OKAY TO INCREASE TUBE FEEDING TO 30ML/HR AS PER MD ORDERS.

*PATIENT HAS GOAL RATE OF 40ML/HR

## 2022-09-03 NOTE — NUR
MEDICATIONS VIA OGT GIVEN. CHECKED ORAL GASTRIC RESIDUAL AND IT'S 50 ML CREAMY THICK 
RESIDUAL. WILL CONT. WITH FEEDING NEPRO AT 40 ML/HR.

## 2022-09-03 NOTE — NUR
PT CLEAN AND REPOSITIONED, AURIC, NO BM NOTICED. PT TOLERATED WELL. VSS. WILL CONTINUE TO 
CLOSE MONITOR.

## 2022-09-04 VITALS — SYSTOLIC BLOOD PRESSURE: 147 MMHG | DIASTOLIC BLOOD PRESSURE: 72 MMHG

## 2022-09-04 VITALS — SYSTOLIC BLOOD PRESSURE: 121 MMHG | DIASTOLIC BLOOD PRESSURE: 62 MMHG

## 2022-09-04 VITALS — SYSTOLIC BLOOD PRESSURE: 131 MMHG | DIASTOLIC BLOOD PRESSURE: 65 MMHG

## 2022-09-04 VITALS — DIASTOLIC BLOOD PRESSURE: 65 MMHG | SYSTOLIC BLOOD PRESSURE: 120 MMHG

## 2022-09-04 VITALS — DIASTOLIC BLOOD PRESSURE: 71 MMHG | SYSTOLIC BLOOD PRESSURE: 138 MMHG

## 2022-09-04 VITALS — SYSTOLIC BLOOD PRESSURE: 138 MMHG | DIASTOLIC BLOOD PRESSURE: 69 MMHG

## 2022-09-04 VITALS — SYSTOLIC BLOOD PRESSURE: 140 MMHG | DIASTOLIC BLOOD PRESSURE: 70 MMHG

## 2022-09-04 VITALS — SYSTOLIC BLOOD PRESSURE: 144 MMHG | DIASTOLIC BLOOD PRESSURE: 67 MMHG

## 2022-09-04 VITALS — SYSTOLIC BLOOD PRESSURE: 130 MMHG | DIASTOLIC BLOOD PRESSURE: 67 MMHG

## 2022-09-04 VITALS — SYSTOLIC BLOOD PRESSURE: 160 MMHG | DIASTOLIC BLOOD PRESSURE: 61 MMHG

## 2022-09-04 VITALS — DIASTOLIC BLOOD PRESSURE: 70 MMHG | SYSTOLIC BLOOD PRESSURE: 146 MMHG

## 2022-09-04 VITALS — SYSTOLIC BLOOD PRESSURE: 130 MMHG | DIASTOLIC BLOOD PRESSURE: 64 MMHG

## 2022-09-04 VITALS — DIASTOLIC BLOOD PRESSURE: 65 MMHG | SYSTOLIC BLOOD PRESSURE: 123 MMHG

## 2022-09-04 VITALS — SYSTOLIC BLOOD PRESSURE: 141 MMHG | DIASTOLIC BLOOD PRESSURE: 73 MMHG

## 2022-09-04 VITALS — DIASTOLIC BLOOD PRESSURE: 72 MMHG | SYSTOLIC BLOOD PRESSURE: 142 MMHG

## 2022-09-04 VITALS — SYSTOLIC BLOOD PRESSURE: 139 MMHG | DIASTOLIC BLOOD PRESSURE: 72 MMHG

## 2022-09-04 VITALS — SYSTOLIC BLOOD PRESSURE: 146 MMHG | DIASTOLIC BLOOD PRESSURE: 76 MMHG

## 2022-09-04 VITALS — DIASTOLIC BLOOD PRESSURE: 66 MMHG | SYSTOLIC BLOOD PRESSURE: 130 MMHG

## 2022-09-04 VITALS — SYSTOLIC BLOOD PRESSURE: 147 MMHG | DIASTOLIC BLOOD PRESSURE: 75 MMHG

## 2022-09-04 VITALS — SYSTOLIC BLOOD PRESSURE: 138 MMHG | DIASTOLIC BLOOD PRESSURE: 68 MMHG

## 2022-09-04 VITALS — SYSTOLIC BLOOD PRESSURE: 144 MMHG | DIASTOLIC BLOOD PRESSURE: 70 MMHG

## 2022-09-04 VITALS — SYSTOLIC BLOOD PRESSURE: 137 MMHG | DIASTOLIC BLOOD PRESSURE: 67 MMHG

## 2022-09-04 VITALS — DIASTOLIC BLOOD PRESSURE: 66 MMHG | SYSTOLIC BLOOD PRESSURE: 135 MMHG

## 2022-09-04 VITALS — SYSTOLIC BLOOD PRESSURE: 144 MMHG | DIASTOLIC BLOOD PRESSURE: 63 MMHG

## 2022-09-04 VITALS — DIASTOLIC BLOOD PRESSURE: 64 MMHG | SYSTOLIC BLOOD PRESSURE: 150 MMHG

## 2022-09-04 VITALS — DIASTOLIC BLOOD PRESSURE: 68 MMHG | SYSTOLIC BLOOD PRESSURE: 143 MMHG

## 2022-09-04 VITALS — DIASTOLIC BLOOD PRESSURE: 71 MMHG | SYSTOLIC BLOOD PRESSURE: 140 MMHG

## 2022-09-04 VITALS — SYSTOLIC BLOOD PRESSURE: 139 MMHG | DIASTOLIC BLOOD PRESSURE: 69 MMHG

## 2022-09-04 VITALS — SYSTOLIC BLOOD PRESSURE: 134 MMHG | DIASTOLIC BLOOD PRESSURE: 67 MMHG

## 2022-09-04 VITALS — SYSTOLIC BLOOD PRESSURE: 126 MMHG | DIASTOLIC BLOOD PRESSURE: 64 MMHG

## 2022-09-04 LAB
ANION GAP SERPL CALCULATED.3IONS-SCNC: 15.7 MMOL/L (ref 8–16)
BASOPHILS # BLD AUTO: 0.2 K/UL (ref 0–0.22)
BASOPHILS NFR BLD AUTO: 1.2 % (ref 0–2)
BUN SERPL-MCNC: 40 MG/DL (ref 7–18)
CHLORIDE SERPL-SCNC: 96 MMOL/L (ref 98–107)
CO2 SERPL-SCNC: 24 MMOL/L (ref 21–32)
CREAT SERPL-MCNC: 3.8 MG/DL (ref 0.6–1.3)
EOSINOPHIL # BLD AUTO: 0.6 K/UL (ref 0–0.4)
EOSINOPHIL NFR BLD AUTO: 3.1 % (ref 0–4)
ERYTHROCYTE [DISTWIDTH] IN BLOOD BY AUTOMATED COUNT: 18.4 % (ref 11.6–13.7)
GFR SERPL CREATININE-BSD FRML MDRD: 21 ML/MIN (ref 90–?)
GLUCOSE SERPL-MCNC: 122 MG/DL (ref 74–106)
HCT VFR BLD AUTO: 28.9 % (ref 36–52)
HGB BLD-MCNC: 9.7 G/DL (ref 12–18)
LYMPHOCYTES # BLD AUTO: 1.1 K/UL (ref 2–11.5)
LYMPHOCYTES NFR BLD AUTO: 5.5 % (ref 20.5–51.1)
MCH RBC QN AUTO: 30 PG (ref 27–31)
MCHC RBC AUTO-ENTMCNC: 34 G/DL (ref 33–37)
MCV RBC AUTO: 89.9 FL (ref 80–94)
MONOCYTES # BLD AUTO: 1.5 K/UL (ref 0.8–1)
MONOCYTES NFR BLD AUTO: 7.7 % (ref 1.7–9.3)
NEUTROPHILS # BLD AUTO: 15.8 K/UL (ref 1.8–7.7)
NEUTROPHILS NFR BLD AUTO: 82.5 % (ref 42.2–75.2)
PLATELET # BLD AUTO: 339 K/UL (ref 140–450)
POTASSIUM SERPL-SCNC: 3.7 MMOL/L (ref 3.5–5.1)
RBC # BLD AUTO: 3.21 MIL/UL (ref 4.2–6.1)
SODIUM SERPL-SCNC: 132 MMOL/L (ref 136–145)
WBC # BLD AUTO: 19.1 K/UL (ref 4.8–10.8)

## 2022-09-04 RX ADMIN — INSULIN LISPRO PRN UNITS: 100 INJECTION, SOLUTION INTRAVENOUS; SUBCUTANEOUS at 11:43

## 2022-09-04 RX ADMIN — IPRATROPIUM BROMIDE AND ALBUTEROL SULFATE SCH ML: .5; 3 SOLUTION RESPIRATORY (INHALATION) at 19:50

## 2022-09-04 RX ADMIN — Medication SCH DEV: at 06:27

## 2022-09-04 RX ADMIN — INSULIN LISPRO PRN UNITS: 100 INJECTION, SOLUTION INTRAVENOUS; SUBCUTANEOUS at 00:23

## 2022-09-04 RX ADMIN — SODIUM CHLORIDE SCH MLS/HR: 9 INJECTION, SOLUTION INTRAVENOUS at 09:18

## 2022-09-04 RX ADMIN — INSULIN LISPRO PRN UNITS: 100 INJECTION, SOLUTION INTRAVENOUS; SUBCUTANEOUS at 17:26

## 2022-09-04 RX ADMIN — Medication SCH TAB: at 09:21

## 2022-09-04 RX ADMIN — IPRATROPIUM BROMIDE AND ALBUTEROL SULFATE SCH ML: .5; 3 SOLUTION RESPIRATORY (INHALATION) at 13:39

## 2022-09-04 RX ADMIN — SODIUM CHLORIDE SCH MLS/HR: 9 INJECTION, SOLUTION INTRAVENOUS at 20:11

## 2022-09-04 RX ADMIN — Medication SCH DEV: at 11:44

## 2022-09-04 RX ADMIN — Medication SCH DEV: at 17:26

## 2022-09-04 RX ADMIN — INSULIN LISPRO PRN UNITS: 100 INJECTION, SOLUTION INTRAVENOUS; SUBCUTANEOUS at 06:27

## 2022-09-04 RX ADMIN — PANTOPRAZOLE SODIUM SCH MG: 40 INJECTION, POWDER, FOR SOLUTION INTRAVENOUS at 09:19

## 2022-09-04 RX ADMIN — Medication SCH EA: at 13:06

## 2022-09-04 RX ADMIN — Medication SCH DEV: at 00:24

## 2022-09-04 RX ADMIN — IPRATROPIUM BROMIDE AND ALBUTEROL SULFATE SCH ML: .5; 3 SOLUTION RESPIRATORY (INHALATION) at 07:19

## 2022-09-04 NOTE — NUR
PT. GASTRIC RESIDUAL AT 0000, 40 ML CREAMY THICK FLUID AND 50 ML CREAMY THICK LIQUID AT THIS 
TIME. FEEDING NEPRO 40 ML INFUSING AND WILL CONT. TO MONITOR.

## 2022-09-04 NOTE — NUR
PT. GASTRIC RESIDUAL 15 ML CREAMY THICK FLUID. FEEDING NEPRO 40 ML INFUSING AND WILL CONT. 
TO MONITOR. LIQUID COLACE GIVEN.

## 2022-09-04 NOTE — NUR
RECEIVED BEDSIDE REPORT FROM SUDARSHAN SUN RN FOR CONTINUITY OF CARE. PT SEDATED RASS -3, 
SUPINE IN THE BED. ETT TO VENT ACPRVC FIO2 28%  R 20 PEEP 5. COARSE CRACKLES LUNG 
SOUNDS. SR ON BEDSIDE MONITOR. RFA 20G IV PATENT. INFUSING PROPOFOL AT 30MCG/KG/MIN. OGT 
INPLACED, PATENT, INFUSING NEPRO 40ML/HR. FREE WASH WATER 100CC Q4HR. CHEST TUBE DRAINAGE 
30CC COLLECTED OVER THE NIGHT. [facility] EDUARD CATH IN PLACE, CLEAN DRY INTACT. AUSCULTATED S 1 
AND 2. BOWEL SOUNDS ACTIVE, LAST BM 9/1. PT ANURIC. W GENERALIZED WEAKNESS TO BUE BLE. SKIN 
NOT INTACT, SEE WOUND ASSESSMENT. ON STANDARD ISOLATION. SAFETY PRECAUTIONS MET. CALL LIGHT 
WITHIN REACH. INITIAL ASSESSMENT COMPLETE, WILL CONTINUE TO CLOSELY MONITOR.

## 2022-09-04 NOTE — NUR
PT PLACED ON SBT MODE SHAVON STEVEN AT BEDSIDE. PROPOFOL OFF. FOLLOWING COMMANDS, VERBALIZE 
UNDERSTANDING. WILL CONTINUE TO CLOSELY MONITOR.

-------------------------------------------------------------------------------

Addendum: 09/04/22 at 1020 by Katheryn Berry RN

-------------------------------------------------------------------------------

PT DOES NOT VERBALIZE UNDERSTANDING, VENTILATED. PT NODS HEAD AND SQUEEZES HAND TO 
COMMUNICATE UNDERSTANDING.

## 2022-09-04 NOTE — NUR
PATIENT IS SEDATED A&O X0. VENT SETTINGS AC PVRC FI02 28  RATE 20 PEEP 5 PEAK 22 PEEPE 
5. CHEST TUBE TO LEFT LATERAL SIDE IS INTACT AND DRAINING WELL. OGT TUBE FEEDING NEPRO AT 
40ML/HR AND 100ML FLUSH Q4H. SR ON BEDSIDE MONITOR. 20 GAUGE IV SITE TO RIGHT FOREARM X2. 
PROPOFOL RUNNING AT 15MCG/KG/MIN. RIGHT IIJ CENTRAL FOR DIALYSIS. RESTRAINTS IN PLACE AND NO 
NEW INJURIES NOTED. NO S/SX OF PAIN AT THIS TIME.

## 2022-09-04 NOTE — NUR
DR GUERRERO ROUNDING AT BEDSIDE. PT INFORMATION UPDATED. DR GUERRERO WANT TO BE NOTIFIED AFTER 
TOMORROW CPAP(9/5/22)TRAIL W/ UPDATED INFORMATION.

## 2022-09-04 NOTE — NUR
WEANING PROPOFOL. PT WAKES UP, CAN FOLLOW COMMANDS. NODS HEAD FOR UNDERSTANDING. READY FOR 
CPAP TRIAL. CALL TO RT MAURIZIO.

## 2022-09-04 NOTE — NUR
PT PLACED ON SBT CPAP 5 PS 10 AND FIO2 28%. NURSE BEDSIDE AND AWARE. ALARMS SET 
APPROPRIATELY AND FUNCTIONING. WILL CONTINUE TO MONITOR.

## 2022-09-05 VITALS — SYSTOLIC BLOOD PRESSURE: 146 MMHG | DIASTOLIC BLOOD PRESSURE: 75 MMHG

## 2022-09-05 VITALS — DIASTOLIC BLOOD PRESSURE: 72 MMHG | SYSTOLIC BLOOD PRESSURE: 146 MMHG

## 2022-09-05 VITALS — DIASTOLIC BLOOD PRESSURE: 75 MMHG | SYSTOLIC BLOOD PRESSURE: 181 MMHG

## 2022-09-05 VITALS — DIASTOLIC BLOOD PRESSURE: 67 MMHG | SYSTOLIC BLOOD PRESSURE: 150 MMHG

## 2022-09-05 VITALS — DIASTOLIC BLOOD PRESSURE: 78 MMHG | SYSTOLIC BLOOD PRESSURE: 151 MMHG

## 2022-09-05 VITALS — DIASTOLIC BLOOD PRESSURE: 67 MMHG | SYSTOLIC BLOOD PRESSURE: 163 MMHG

## 2022-09-05 VITALS — SYSTOLIC BLOOD PRESSURE: 146 MMHG | DIASTOLIC BLOOD PRESSURE: 78 MMHG

## 2022-09-05 VITALS — DIASTOLIC BLOOD PRESSURE: 73 MMHG | SYSTOLIC BLOOD PRESSURE: 150 MMHG

## 2022-09-05 VITALS — SYSTOLIC BLOOD PRESSURE: 155 MMHG | DIASTOLIC BLOOD PRESSURE: 76 MMHG

## 2022-09-05 VITALS — DIASTOLIC BLOOD PRESSURE: 74 MMHG | SYSTOLIC BLOOD PRESSURE: 148 MMHG

## 2022-09-05 VITALS — SYSTOLIC BLOOD PRESSURE: 134 MMHG | DIASTOLIC BLOOD PRESSURE: 76 MMHG

## 2022-09-05 VITALS — SYSTOLIC BLOOD PRESSURE: 158 MMHG | DIASTOLIC BLOOD PRESSURE: 73 MMHG

## 2022-09-05 VITALS — DIASTOLIC BLOOD PRESSURE: 73 MMHG | SYSTOLIC BLOOD PRESSURE: 153 MMHG

## 2022-09-05 VITALS — SYSTOLIC BLOOD PRESSURE: 152 MMHG | DIASTOLIC BLOOD PRESSURE: 77 MMHG

## 2022-09-05 VITALS — DIASTOLIC BLOOD PRESSURE: 71 MMHG | SYSTOLIC BLOOD PRESSURE: 145 MMHG

## 2022-09-05 VITALS — SYSTOLIC BLOOD PRESSURE: 145 MMHG | DIASTOLIC BLOOD PRESSURE: 64 MMHG

## 2022-09-05 VITALS — DIASTOLIC BLOOD PRESSURE: 69 MMHG | SYSTOLIC BLOOD PRESSURE: 148 MMHG

## 2022-09-05 VITALS — DIASTOLIC BLOOD PRESSURE: 71 MMHG | SYSTOLIC BLOOD PRESSURE: 147 MMHG

## 2022-09-05 VITALS — SYSTOLIC BLOOD PRESSURE: 148 MMHG | DIASTOLIC BLOOD PRESSURE: 70 MMHG

## 2022-09-05 VITALS — SYSTOLIC BLOOD PRESSURE: 141 MMHG | DIASTOLIC BLOOD PRESSURE: 71 MMHG

## 2022-09-05 VITALS — SYSTOLIC BLOOD PRESSURE: 145 MMHG | DIASTOLIC BLOOD PRESSURE: 75 MMHG

## 2022-09-05 VITALS — SYSTOLIC BLOOD PRESSURE: 148 MMHG | DIASTOLIC BLOOD PRESSURE: 84 MMHG

## 2022-09-05 VITALS — SYSTOLIC BLOOD PRESSURE: 162 MMHG | DIASTOLIC BLOOD PRESSURE: 80 MMHG

## 2022-09-05 VITALS — DIASTOLIC BLOOD PRESSURE: 54 MMHG | SYSTOLIC BLOOD PRESSURE: 140 MMHG

## 2022-09-05 VITALS — SYSTOLIC BLOOD PRESSURE: 140 MMHG | DIASTOLIC BLOOD PRESSURE: 70 MMHG

## 2022-09-05 VITALS — SYSTOLIC BLOOD PRESSURE: 149 MMHG | DIASTOLIC BLOOD PRESSURE: 73 MMHG

## 2022-09-05 VITALS — DIASTOLIC BLOOD PRESSURE: 72 MMHG | SYSTOLIC BLOOD PRESSURE: 143 MMHG

## 2022-09-05 VITALS — SYSTOLIC BLOOD PRESSURE: 150 MMHG | DIASTOLIC BLOOD PRESSURE: 72 MMHG

## 2022-09-05 VITALS — DIASTOLIC BLOOD PRESSURE: 62 MMHG | SYSTOLIC BLOOD PRESSURE: 147 MMHG

## 2022-09-05 VITALS — SYSTOLIC BLOOD PRESSURE: 149 MMHG | DIASTOLIC BLOOD PRESSURE: 69 MMHG

## 2022-09-05 LAB
ANION GAP SERPL CALCULATED.3IONS-SCNC: 16.8 MMOL/L (ref 8–16)
BASOPHILS # BLD AUTO: 0.1 K/UL (ref 0–0.22)
BASOPHILS NFR BLD AUTO: 0.9 % (ref 0–2)
BUN SERPL-MCNC: 53 MG/DL (ref 7–18)
CHLORIDE SERPL-SCNC: 92 MMOL/L (ref 98–107)
CO2 SERPL-SCNC: 23.4 MMOL/L (ref 21–32)
CREAT SERPL-MCNC: 4.5 MG/DL (ref 0.6–1.3)
EOSINOPHIL # BLD AUTO: 1.2 K/UL (ref 0–0.4)
EOSINOPHIL NFR BLD AUTO: 8.1 % (ref 0–4)
ERYTHROCYTE [DISTWIDTH] IN BLOOD BY AUTOMATED COUNT: 18.2 % (ref 11.6–13.7)
GFR SERPL CREATININE-BSD FRML MDRD: 17 ML/MIN (ref 90–?)
GLUCOSE SERPL-MCNC: 148 MG/DL (ref 74–106)
HCT VFR BLD AUTO: 28.1 % (ref 36–52)
HGB BLD-MCNC: 9.4 G/DL (ref 12–18)
LYMPHOCYTES # BLD AUTO: 0.9 K/UL (ref 2–11.5)
LYMPHOCYTES NFR BLD AUTO: 5.8 % (ref 20.5–51.1)
MCH RBC QN AUTO: 30 PG (ref 27–31)
MCHC RBC AUTO-ENTMCNC: 33 G/DL (ref 33–37)
MCV RBC AUTO: 89.7 FL (ref 80–94)
MONOCYTES # BLD AUTO: 1.1 K/UL (ref 0.8–1)
MONOCYTES NFR BLD AUTO: 7.3 % (ref 1.7–9.3)
NEUTROPHILS # BLD AUTO: 11.6 K/UL (ref 1.8–7.7)
NEUTROPHILS NFR BLD AUTO: 77.9 % (ref 42.2–75.2)
PLATELET # BLD AUTO: 364 K/UL (ref 140–450)
POTASSIUM SERPL-SCNC: 4.2 MMOL/L (ref 3.5–5.1)
RBC # BLD AUTO: 3.14 MIL/UL (ref 4.2–6.1)
SODIUM SERPL-SCNC: 128 MMOL/L (ref 136–145)
WBC # BLD AUTO: 14.9 K/UL (ref 4.8–10.8)

## 2022-09-05 PROCEDURE — 5A1D70Z PERFORMANCE OF URINARY FILTRATION, INTERMITTENT, LESS THAN 6 HOURS PER DAY: ICD-10-PCS

## 2022-09-05 RX ADMIN — PROPOFOL PRN MLS/HR: 10 INJECTION, EMULSION INTRAVENOUS at 13:00

## 2022-09-05 RX ADMIN — Medication SCH TAB: at 08:41

## 2022-09-05 RX ADMIN — SODIUM CHLORIDE SCH MLS/HR: 9 INJECTION, SOLUTION INTRAVENOUS at 08:40

## 2022-09-05 RX ADMIN — IPRATROPIUM BROMIDE AND ALBUTEROL SULFATE SCH ML: .5; 3 SOLUTION RESPIRATORY (INHALATION) at 19:00

## 2022-09-05 RX ADMIN — Medication SCH DEV: at 11:19

## 2022-09-05 RX ADMIN — Medication SCH DEV: at 00:00

## 2022-09-05 RX ADMIN — IPRATROPIUM BROMIDE AND ALBUTEROL SULFATE SCH ML: .5; 3 SOLUTION RESPIRATORY (INHALATION) at 13:04

## 2022-09-05 RX ADMIN — IPRATROPIUM BROMIDE AND ALBUTEROL SULFATE SCH ML: .5; 3 SOLUTION RESPIRATORY (INHALATION) at 07:45

## 2022-09-05 RX ADMIN — SODIUM CHLORIDE PRN MG: 9 INJECTION, SOLUTION INTRAVENOUS at 13:52

## 2022-09-05 RX ADMIN — Medication SCH DEV: at 18:18

## 2022-09-05 RX ADMIN — SODIUM CHLORIDE SCH MLS/HR: 9 INJECTION, SOLUTION INTRAVENOUS at 20:13

## 2022-09-05 RX ADMIN — EPOETIN ALFA-EPBX SCH UNITS: 10000 INJECTION, SOLUTION INTRAVENOUS; SUBCUTANEOUS at 08:41

## 2022-09-05 RX ADMIN — PANTOPRAZOLE SODIUM SCH MG: 40 INJECTION, POWDER, FOR SOLUTION INTRAVENOUS at 08:41

## 2022-09-05 RX ADMIN — Medication SCH EA: at 13:10

## 2022-09-05 RX ADMIN — Medication SCH DEV: at 06:17

## 2022-09-05 NOTE — NUR
ATTEMPTED TO SEE PATIENT FOR PHYSICAL THERAPY BUT WAS DEEMED NOT APPROPRIATE D/T CHANGE IN 
CONDITION. PATIENT IS NOW SEDATED AND INTUBATED. NURSING MADE AWARE AND WILL WRITE NEW ORDER 
FOR PHYSICAL THERAPY WHEN PATIENT IS APPROPRIATE.

## 2022-09-05 NOTE — NUR
PT PLACED ON SBT CPAP 5 PS 10 AND FIO2 28%, NURSE AWARE, ALARMS SET APPROPRIATELY, ON AND 
FUNCTIONING. WILL CONTINUE TO MONITOR.

## 2022-09-05 NOTE — NUR
RECEIVED BEDSIDE REPORT FROM NIGHT RN FOR CONTINUITY OF CARE. PT   IN BED WITH HOB ELEVATED. 
PT SEDATED RASS -3. ETT TO VENT ACPRVC FIO2 28%  R 20 PEEP 5. COARSE CRACKLES LUNG 
SOUNDS. SR ON BEDSIDE MONITOR. RFA 20G IV PATENT. INFUSING PROPOFOL AT 15 MCG/KG/MIN. RIJ 
TUNNEL CATH IN PLACE, CLEAN DRY INTACT. AUSCULTATED S 1 AND 2. BOWEL SOUNDS ACTIVE. PT 
ANURIC. W GENERALIZED WEAKNESS TO BUE BLE. SKIN NOT INTACT, SEE WOUND ASSESSMENT. ON 
STANDARD ISOLATION. SAFETY PRECAUTIONS MET. CALL LIGHT WITHIN REACH. INITIAL ASSESSMENT 
COMPLETE, WILL CONTINUE TO CLOSELY MONITOR.

## 2022-09-05 NOTE — NUR
RECEIVED REPORT FROM AM SHIFT TATUM MALIK. PT SEDATED AND HAS ETT TO VENT. VENT SETTINGS FI02 28 
 RATE 20 PEEP 5 PPEAK 23 PEEPE 4. CHEST TUBE INTACT AND DRAINING WELL. OGT TUBE 
FEEDING NEPRO AT 40ML/HR 100ML FLUSH Q4H. INCONTINENT TO BOWEL MOVEMENTS AND IS ANURIC. 
NORMAL SINUS RHYTHM TO BEDSIDE MONITOR. RIGHT IJ EDUARD CATHETER FOR HD. HD DONE IN AM 
SHIFT 2.8L OUT AS PER AM NURSE. WOUND TO SACRAL AND LEFT HEEL. RESTRAINTS IN PLACE. NO SIGNS 
OF INJURIES.

## 2022-09-06 VITALS — SYSTOLIC BLOOD PRESSURE: 154 MMHG | DIASTOLIC BLOOD PRESSURE: 72 MMHG

## 2022-09-06 VITALS — SYSTOLIC BLOOD PRESSURE: 157 MMHG | DIASTOLIC BLOOD PRESSURE: 74 MMHG

## 2022-09-06 VITALS — DIASTOLIC BLOOD PRESSURE: 79 MMHG | SYSTOLIC BLOOD PRESSURE: 166 MMHG

## 2022-09-06 VITALS — DIASTOLIC BLOOD PRESSURE: 73 MMHG | SYSTOLIC BLOOD PRESSURE: 142 MMHG

## 2022-09-06 VITALS — SYSTOLIC BLOOD PRESSURE: 146 MMHG | DIASTOLIC BLOOD PRESSURE: 67 MMHG

## 2022-09-06 VITALS — DIASTOLIC BLOOD PRESSURE: 77 MMHG | SYSTOLIC BLOOD PRESSURE: 162 MMHG

## 2022-09-06 VITALS — DIASTOLIC BLOOD PRESSURE: 77 MMHG | SYSTOLIC BLOOD PRESSURE: 152 MMHG

## 2022-09-06 VITALS — SYSTOLIC BLOOD PRESSURE: 156 MMHG | DIASTOLIC BLOOD PRESSURE: 73 MMHG

## 2022-09-06 VITALS — SYSTOLIC BLOOD PRESSURE: 157 MMHG | DIASTOLIC BLOOD PRESSURE: 81 MMHG

## 2022-09-06 VITALS — SYSTOLIC BLOOD PRESSURE: 157 MMHG | DIASTOLIC BLOOD PRESSURE: 73 MMHG

## 2022-09-06 VITALS — DIASTOLIC BLOOD PRESSURE: 95 MMHG | SYSTOLIC BLOOD PRESSURE: 122 MMHG

## 2022-09-06 VITALS — SYSTOLIC BLOOD PRESSURE: 144 MMHG | DIASTOLIC BLOOD PRESSURE: 72 MMHG

## 2022-09-06 VITALS — DIASTOLIC BLOOD PRESSURE: 85 MMHG | SYSTOLIC BLOOD PRESSURE: 154 MMHG

## 2022-09-06 VITALS — DIASTOLIC BLOOD PRESSURE: 74 MMHG | SYSTOLIC BLOOD PRESSURE: 155 MMHG

## 2022-09-06 VITALS — DIASTOLIC BLOOD PRESSURE: 69 MMHG | SYSTOLIC BLOOD PRESSURE: 146 MMHG

## 2022-09-06 VITALS — SYSTOLIC BLOOD PRESSURE: 158 MMHG | DIASTOLIC BLOOD PRESSURE: 76 MMHG

## 2022-09-06 VITALS — DIASTOLIC BLOOD PRESSURE: 71 MMHG | SYSTOLIC BLOOD PRESSURE: 152 MMHG

## 2022-09-06 VITALS — SYSTOLIC BLOOD PRESSURE: 147 MMHG | DIASTOLIC BLOOD PRESSURE: 75 MMHG

## 2022-09-06 VITALS — SYSTOLIC BLOOD PRESSURE: 162 MMHG | DIASTOLIC BLOOD PRESSURE: 72 MMHG

## 2022-09-06 VITALS — SYSTOLIC BLOOD PRESSURE: 148 MMHG | DIASTOLIC BLOOD PRESSURE: 69 MMHG

## 2022-09-06 VITALS — SYSTOLIC BLOOD PRESSURE: 149 MMHG | DIASTOLIC BLOOD PRESSURE: 66 MMHG

## 2022-09-06 VITALS — SYSTOLIC BLOOD PRESSURE: 152 MMHG | DIASTOLIC BLOOD PRESSURE: 71 MMHG

## 2022-09-06 VITALS — SYSTOLIC BLOOD PRESSURE: 151 MMHG | DIASTOLIC BLOOD PRESSURE: 74 MMHG

## 2022-09-06 VITALS — SYSTOLIC BLOOD PRESSURE: 151 MMHG | DIASTOLIC BLOOD PRESSURE: 81 MMHG

## 2022-09-06 VITALS — DIASTOLIC BLOOD PRESSURE: 73 MMHG | SYSTOLIC BLOOD PRESSURE: 144 MMHG

## 2022-09-06 VITALS — DIASTOLIC BLOOD PRESSURE: 76 MMHG | SYSTOLIC BLOOD PRESSURE: 149 MMHG

## 2022-09-06 VITALS — SYSTOLIC BLOOD PRESSURE: 154 MMHG | DIASTOLIC BLOOD PRESSURE: 73 MMHG

## 2022-09-06 VITALS — SYSTOLIC BLOOD PRESSURE: 153 MMHG | DIASTOLIC BLOOD PRESSURE: 71 MMHG

## 2022-09-06 VITALS — DIASTOLIC BLOOD PRESSURE: 73 MMHG | SYSTOLIC BLOOD PRESSURE: 156 MMHG

## 2022-09-06 VITALS — DIASTOLIC BLOOD PRESSURE: 74 MMHG | SYSTOLIC BLOOD PRESSURE: 154 MMHG

## 2022-09-06 LAB
ANION GAP SERPL CALCULATED.3IONS-SCNC: 15.8 MMOL/L (ref 8–16)
BASOPHILS # BLD AUTO: 0.2 K/UL (ref 0–0.22)
BASOPHILS NFR BLD AUTO: 0.9 % (ref 0–2)
BUN SERPL-MCNC: 33 MG/DL (ref 7–18)
CHLORIDE SERPL-SCNC: 97 MMOL/L (ref 98–107)
CO2 SERPL-SCNC: 24.2 MMOL/L (ref 21–32)
CREAT SERPL-MCNC: 3.4 MG/DL (ref 0.6–1.3)
EOSINOPHIL # BLD AUTO: 1.3 K/UL (ref 0–0.4)
EOSINOPHIL NFR BLD AUTO: 7.5 % (ref 0–4)
ERYTHROCYTE [DISTWIDTH] IN BLOOD BY AUTOMATED COUNT: 18.2 % (ref 11.6–13.7)
GFR SERPL CREATININE-BSD FRML MDRD: 24 ML/MIN (ref 90–?)
GLUCOSE SERPL-MCNC: 88 MG/DL (ref 74–106)
HCT VFR BLD AUTO: 28.1 % (ref 36–52)
HGB BLD-MCNC: 9.4 G/DL (ref 12–18)
LYMPHOCYTES # BLD AUTO: 1.1 K/UL (ref 2–11.5)
LYMPHOCYTES NFR BLD AUTO: 5.9 % (ref 20.5–51.1)
MCH RBC QN AUTO: 30 PG (ref 27–31)
MCHC RBC AUTO-ENTMCNC: 34 G/DL (ref 33–37)
MCV RBC AUTO: 90.8 FL (ref 80–94)
MONOCYTES # BLD AUTO: 1.6 K/UL (ref 0.8–1)
MONOCYTES NFR BLD AUTO: 9 % (ref 1.7–9.3)
NEUTROPHILS # BLD AUTO: 13.7 K/UL (ref 1.8–7.7)
NEUTROPHILS NFR BLD AUTO: 76.7 % (ref 42.2–75.2)
PLATELET # BLD AUTO: 324 K/UL (ref 140–450)
POTASSIUM SERPL-SCNC: 4 MMOL/L (ref 3.5–5.1)
RBC # BLD AUTO: 3.09 MIL/UL (ref 4.2–6.1)
SODIUM SERPL-SCNC: 133 MMOL/L (ref 136–145)
WBC # BLD AUTO: 17.9 K/UL (ref 4.8–10.8)

## 2022-09-06 RX ADMIN — Medication SCH DEV: at 06:30

## 2022-09-06 RX ADMIN — SODIUM CHLORIDE SCH MLS/HR: 9 INJECTION, SOLUTION INTRAVENOUS at 08:25

## 2022-09-06 RX ADMIN — Medication SCH DEV: at 11:21

## 2022-09-06 RX ADMIN — IPRATROPIUM BROMIDE AND ALBUTEROL SULFATE SCH ML: .5; 3 SOLUTION RESPIRATORY (INHALATION) at 18:13

## 2022-09-06 RX ADMIN — SODIUM CHLORIDE SCH MLS/HR: 9 INJECTION, SOLUTION INTRAVENOUS at 21:08

## 2022-09-06 RX ADMIN — PROPOFOL PRN MLS/HR: 10 INJECTION, EMULSION INTRAVENOUS at 18:59

## 2022-09-06 RX ADMIN — INSULIN LISPRO PRN UNITS: 100 INJECTION, SOLUTION INTRAVENOUS; SUBCUTANEOUS at 01:52

## 2022-09-06 RX ADMIN — Medication SCH DEV: at 23:52

## 2022-09-06 RX ADMIN — PANTOPRAZOLE SODIUM SCH MG: 40 INJECTION, POWDER, FOR SOLUTION INTRAVENOUS at 08:25

## 2022-09-06 RX ADMIN — IPRATROPIUM BROMIDE AND ALBUTEROL SULFATE SCH ML: .5; 3 SOLUTION RESPIRATORY (INHALATION) at 13:37

## 2022-09-06 RX ADMIN — Medication SCH DEV: at 00:00

## 2022-09-06 RX ADMIN — IPRATROPIUM BROMIDE AND ALBUTEROL SULFATE SCH ML: .5; 3 SOLUTION RESPIRATORY (INHALATION) at 07:56

## 2022-09-06 RX ADMIN — INSULIN LISPRO PRN UNITS: 100 INJECTION, SOLUTION INTRAVENOUS; SUBCUTANEOUS at 23:51

## 2022-09-06 RX ADMIN — Medication SCH DEV: at 18:38

## 2022-09-06 RX ADMIN — Medication SCH TAB: at 08:24

## 2022-09-06 RX ADMIN — SODIUM CHLORIDE PRN MG: 9 INJECTION, SOLUTION INTRAVENOUS at 19:01

## 2022-09-06 RX ADMIN — Medication SCH EA: at 13:00

## 2022-09-06 RX ADMIN — SODIUM CHLORIDE PRN MG: 9 INJECTION, SOLUTION INTRAVENOUS at 14:35

## 2022-09-06 RX ADMIN — PROPOFOL PRN MLS/HR: 10 INJECTION, EMULSION INTRAVENOUS at 04:00

## 2022-09-06 NOTE — NUR
Pt had medium loose brown bowel movement. Pericare rendered. Suctioned as needed. Turned and 
reposition. Safety precautions in place.

## 2022-09-06 NOTE — NUR
Called Dr. Juares office, but Dr. Juares in [place] today. Left message with Idalia regarding 
available santyl, but no current order and left call back number.

## 2022-09-06 NOTE — NUR
Received pt sedated, RASS-3. ETT to vent settings AC PRVC  RATE 20 PEEP 5 FiO2@28%. 
Sinus rhythm on monitor. OG-tube intact and patent infusing Nepro @40ml/hr with free water 
flush 100 Q4H. Peripheral IV on right forearm 20 gauge intact and patent infusing Propofol 
@15mcg/kg/min. Peripheral IV on right forearm 20 gauge intact and patent infusing NS @TKO. 
Hemodialysis catheter on right IJ intact. Chest tube on left side draining to gravity. 
Safety precautions in place.  

-------------------------------------------------------------------------------

Addendum: 09/06/22 at 0957 by Lilia Dickinson RN

-------------------------------------------------------------------------------

Hemodialysis catheter on right upper chest intact.

## 2022-09-06 NOTE — NUR
PT. CARE ENDORSED TO ME BY TATUM SZYMANSKI. PT. ON ETT A/C PRVC RATE 20,  FIO2 28%, PEEP 5. 
SINUS RHYTHM ON THE MONITOR. IV TO RIGHT LOWER FOREARM 20G, INFUSING PROPOFOL DRIP AT 25 
MCG/KG/MIN AND MID LOWER FOREARM 20G CAPPED AND FLUSHED. PT. HAD HEMODIALYSIS TODAY WITH 
3.5L OUTPUT, ACCESS TO RIGHT SUBCLAVIAN EDUARD CATHETER. PT. IS ANURIC. SKIN NOT INTACT, 
PLS. SEE WOUND CHART. LEFT CHEST TUBE WORKING WELL, DRESSING DRY AND NO S/S OF INFECTION. 
CONT. ON BILATERAL SOFT WRIST RESTRAINT WITH NO S/S OF POOR CIRCULATION OR INJURY. PLACED 
PT. IN COMFORTABLE POSITION AND PROVIDED SAFE AND QUIET ENVIRONMENT. NO /S S OF PAIN AND 
WILL CONT. TO MONITOR.

## 2022-09-06 NOTE — NUR
9/6/22 RD FOLLOW UP COMPLETED. PLEASE REFER TO NUTRITION ASSESSMENT UNDER CARE ACTIVITY FOR 
ESTIMATED NUTRITIONAL NEEDS. 

1. CONTINUE NEPRO @ 40 ML/HR WITH FWF 100ML Q4H

- PROSOURCE (120 KCAL, 30 GRAMS PROTEIN) BID FOR NUTRITION THERAPY/WOUND 
HEALING/MALNUTRITION.

NEPRO WILL PROVIDE 1728 KCAL AND 78 GRAMS OF PROTEIN. WITH PROPOFOL AND PROSOURCE, THIS WILL 
MEET 100% OF ESTIMATED ENERGY NEEDS; ADEQUATE

2. MONITOR GASTRIC RESIDUAL

3. RD TO FOLLOW-UP IN 2-3 DAYS PATIENT IS HIGH RISK.



DARRYL STEWART RD

## 2022-09-07 VITALS — DIASTOLIC BLOOD PRESSURE: 78 MMHG | SYSTOLIC BLOOD PRESSURE: 163 MMHG

## 2022-09-07 VITALS — SYSTOLIC BLOOD PRESSURE: 164 MMHG | DIASTOLIC BLOOD PRESSURE: 81 MMHG

## 2022-09-07 VITALS — DIASTOLIC BLOOD PRESSURE: 68 MMHG | SYSTOLIC BLOOD PRESSURE: 146 MMHG

## 2022-09-07 VITALS — DIASTOLIC BLOOD PRESSURE: 69 MMHG | SYSTOLIC BLOOD PRESSURE: 140 MMHG

## 2022-09-07 VITALS — SYSTOLIC BLOOD PRESSURE: 141 MMHG | DIASTOLIC BLOOD PRESSURE: 63 MMHG

## 2022-09-07 VITALS — SYSTOLIC BLOOD PRESSURE: 162 MMHG | DIASTOLIC BLOOD PRESSURE: 79 MMHG

## 2022-09-07 VITALS — DIASTOLIC BLOOD PRESSURE: 74 MMHG | SYSTOLIC BLOOD PRESSURE: 150 MMHG

## 2022-09-07 VITALS — DIASTOLIC BLOOD PRESSURE: 70 MMHG | SYSTOLIC BLOOD PRESSURE: 147 MMHG

## 2022-09-07 VITALS — SYSTOLIC BLOOD PRESSURE: 154 MMHG | DIASTOLIC BLOOD PRESSURE: 70 MMHG

## 2022-09-07 VITALS — SYSTOLIC BLOOD PRESSURE: 146 MMHG | DIASTOLIC BLOOD PRESSURE: 71 MMHG

## 2022-09-07 VITALS — DIASTOLIC BLOOD PRESSURE: 67 MMHG | SYSTOLIC BLOOD PRESSURE: 143 MMHG

## 2022-09-07 VITALS — SYSTOLIC BLOOD PRESSURE: 166 MMHG | DIASTOLIC BLOOD PRESSURE: 76 MMHG

## 2022-09-07 VITALS — SYSTOLIC BLOOD PRESSURE: 151 MMHG | DIASTOLIC BLOOD PRESSURE: 71 MMHG

## 2022-09-07 VITALS — SYSTOLIC BLOOD PRESSURE: 168 MMHG | DIASTOLIC BLOOD PRESSURE: 76 MMHG

## 2022-09-07 VITALS — DIASTOLIC BLOOD PRESSURE: 79 MMHG | SYSTOLIC BLOOD PRESSURE: 161 MMHG

## 2022-09-07 VITALS — SYSTOLIC BLOOD PRESSURE: 162 MMHG | DIASTOLIC BLOOD PRESSURE: 74 MMHG

## 2022-09-07 VITALS — SYSTOLIC BLOOD PRESSURE: 140 MMHG | DIASTOLIC BLOOD PRESSURE: 68 MMHG

## 2022-09-07 VITALS — SYSTOLIC BLOOD PRESSURE: 168 MMHG | DIASTOLIC BLOOD PRESSURE: 83 MMHG

## 2022-09-07 VITALS — DIASTOLIC BLOOD PRESSURE: 77 MMHG | SYSTOLIC BLOOD PRESSURE: 161 MMHG

## 2022-09-07 VITALS — DIASTOLIC BLOOD PRESSURE: 65 MMHG | SYSTOLIC BLOOD PRESSURE: 145 MMHG

## 2022-09-07 VITALS — SYSTOLIC BLOOD PRESSURE: 168 MMHG | DIASTOLIC BLOOD PRESSURE: 78 MMHG

## 2022-09-07 VITALS — SYSTOLIC BLOOD PRESSURE: 142 MMHG | DIASTOLIC BLOOD PRESSURE: 69 MMHG

## 2022-09-07 VITALS — SYSTOLIC BLOOD PRESSURE: 160 MMHG | DIASTOLIC BLOOD PRESSURE: 75 MMHG

## 2022-09-07 VITALS — SYSTOLIC BLOOD PRESSURE: 150 MMHG | DIASTOLIC BLOOD PRESSURE: 68 MMHG

## 2022-09-07 VITALS — DIASTOLIC BLOOD PRESSURE: 70 MMHG | SYSTOLIC BLOOD PRESSURE: 142 MMHG

## 2022-09-07 VITALS — SYSTOLIC BLOOD PRESSURE: 136 MMHG | DIASTOLIC BLOOD PRESSURE: 65 MMHG

## 2022-09-07 LAB
ANION GAP SERPL CALCULATED.3IONS-SCNC: 15.5 MMOL/L (ref 8–16)
BASOPHILS # BLD AUTO: 0.2 K/UL (ref 0–0.22)
BASOPHILS NFR BLD AUTO: 1.1 % (ref 0–2)
BUN SERPL-MCNC: 23 MG/DL (ref 7–18)
CHLORIDE SERPL-SCNC: 96 MMOL/L (ref 98–107)
CO2 SERPL-SCNC: 23.4 MMOL/L (ref 21–32)
CREAT SERPL-MCNC: 2.8 MG/DL (ref 0.6–1.3)
EOSINOPHIL # BLD AUTO: 1.1 K/UL (ref 0–0.4)
EOSINOPHIL NFR BLD AUTO: 7.6 % (ref 0–4)
ERYTHROCYTE [DISTWIDTH] IN BLOOD BY AUTOMATED COUNT: 18.3 % (ref 11.6–13.7)
GFR SERPL CREATININE-BSD FRML MDRD: 30 ML/MIN (ref 90–?)
GLUCOSE SERPL-MCNC: 142 MG/DL (ref 74–106)
HCT VFR BLD AUTO: 28 % (ref 36–52)
HGB BLD-MCNC: 9.3 G/DL (ref 12–18)
LYMPHOCYTES # BLD AUTO: 1.1 K/UL (ref 2–11.5)
LYMPHOCYTES NFR BLD AUTO: 7.6 % (ref 20.5–51.1)
MCH RBC QN AUTO: 30 PG (ref 27–31)
MCHC RBC AUTO-ENTMCNC: 33 G/DL (ref 33–37)
MCV RBC AUTO: 91.2 FL (ref 80–94)
MONOCYTES # BLD AUTO: 1.5 K/UL (ref 0.8–1)
MONOCYTES NFR BLD AUTO: 10.1 % (ref 1.7–9.3)
NEUTROPHILS # BLD AUTO: 10.6 K/UL (ref 1.8–7.7)
NEUTROPHILS NFR BLD AUTO: 73.6 % (ref 42.2–75.2)
PLATELET # BLD AUTO: 277 K/UL (ref 140–450)
POTASSIUM SERPL-SCNC: 3.9 MMOL/L (ref 3.5–5.1)
RBC # BLD AUTO: 3.08 MIL/UL (ref 4.2–6.1)
SODIUM SERPL-SCNC: 131 MMOL/L (ref 136–145)
WBC # BLD AUTO: 14.4 K/UL (ref 4.8–10.8)

## 2022-09-07 RX ADMIN — PROPOFOL PRN MLS/HR: 10 INJECTION, EMULSION INTRAVENOUS at 04:03

## 2022-09-07 RX ADMIN — Medication SCH TAB: at 08:34

## 2022-09-07 RX ADMIN — INSULIN LISPRO PRN UNITS: 100 INJECTION, SOLUTION INTRAVENOUS; SUBCUTANEOUS at 05:36

## 2022-09-07 RX ADMIN — EPOETIN ALFA-EPBX SCH UNITS: 10000 INJECTION, SOLUTION INTRAVENOUS; SUBCUTANEOUS at 08:36

## 2022-09-07 RX ADMIN — PANTOPRAZOLE SODIUM SCH MG: 40 INJECTION, POWDER, FOR SOLUTION INTRAVENOUS at 08:37

## 2022-09-07 RX ADMIN — Medication SCH DEV: at 17:30

## 2022-09-07 RX ADMIN — SODIUM CHLORIDE SCH MLS/HR: 9 INJECTION, SOLUTION INTRAVENOUS at 20:56

## 2022-09-07 RX ADMIN — Medication SCH EA: at 13:39

## 2022-09-07 RX ADMIN — SODIUM CHLORIDE SCH MLS/HR: 9 INJECTION, SOLUTION INTRAVENOUS at 08:38

## 2022-09-07 RX ADMIN — IPRATROPIUM BROMIDE AND ALBUTEROL SULFATE SCH ML: .5; 3 SOLUTION RESPIRATORY (INHALATION) at 13:55

## 2022-09-07 RX ADMIN — IPRATROPIUM BROMIDE AND ALBUTEROL SULFATE SCH ML: .5; 3 SOLUTION RESPIRATORY (INHALATION) at 07:50

## 2022-09-07 RX ADMIN — PROPOFOL PRN MLS/HR: 10 INJECTION, EMULSION INTRAVENOUS at 08:42

## 2022-09-07 RX ADMIN — IPRATROPIUM BROMIDE AND ALBUTEROL SULFATE SCH ML: .5; 3 SOLUTION RESPIRATORY (INHALATION) at 19:19

## 2022-09-07 RX ADMIN — INSULIN LISPRO PRN UNITS: 100 INJECTION, SOLUTION INTRAVENOUS; SUBCUTANEOUS at 11:26

## 2022-09-07 RX ADMIN — Medication SCH DEV: at 05:36

## 2022-09-07 RX ADMIN — Medication SCH DEV: at 11:26

## 2022-09-07 NOTE — NUR
SPOKE WITH DR. CARRANZA VIA PHONE REGARDING DIALYSIS SCHEDULE. DR CARRANZA TOLD HD NURSE TO HOLD HD 
TODAY, AND RESUME HD ON TOMORROW.

## 2022-09-07 NOTE — NUR
WOUND CARE RE-EVALUATION NOTE:

SKIN ASSESSMENT DONE WITH PRIMARY RN. DAUGHTER AT BED SIDE, ALL QUESTIONS ANSWERED. 
SACRALCOCCYX WOUND RESURFACING WITH SURROUNDING NON-BLANCHABLE REDNESS RESOLVED. LEFT HEEL 
UNSTAGEABLE WOUND NEW WOUND CARE REGIMEN WITH BETADINE, SANTYL OINTMENT, AND KERLIX TO START 
TODAY ORDERED BY PODIATRIST DR. MAURER. CONTINUE WITH NEW PLAN OF CARE FOR LEFT HEEL. CONTINUE 
WITH CURRENT REGIMEN FOR SACRALCOCCYX WOUND. POC DISCUSSED WITH PRIMARY RN.



INTEGUMENTARY:     

-INCONTINENT ASSOCIATE DERMATITIS (IAD) TO: B/L GROINS, INNER THIGH AND POSTERIOR THIGH TO 
SCROTAL, SKIN PINK AND INTACT

-PRESSURE INJURY STAGE 3, SACROCOCCYX 3X2X0.1CM, BED IS % GRANULATING TISSUE, MOIST, 
NO ODOR, SERENA-WOUND SKIN MOIST, HEALING SCAR TISSUE, SURROUNDING NON-BLANCHABLE REDNESS 
RESOLVED

-PRESSURE INJURY UN-STAGEABLE, LEFT HEEL 5X4CM WOUND BED IS RED 10% BROWN/BLACK SLOUGH 
TISSUE, 90% LIGHT YELLOW SLOUGH TISSUE SMALL AMOUNT SEROUS DRAINAGE, NO ODOR, SERENA WOUND 
DENUDED SKIN.

## 2022-09-07 NOTE — NUR
DR GUERRERO ROUNDING AT BEDSIDE. UPDATED PT. INFORMATION. DR GUERRERO ORDER A GAS 30 MIN AFTER 
CPAP TRAIL.

## 2022-09-07 NOTE — NUR
RECEIVED BEDSIDE REPORT FROM SUDARSHAN SUN RN FOR CONTINUITY OF CARE. PT SEDATED RASS -3, 
SUPINE IN THE BED. ETT TO VENT ACPRVC FIO2 28%  R 20 PEEP 5. COARSE CRACKLES LUNG 
SOUNDS. SR ON BEDSIDE MONITOR. RFA 20G IV PATENT. INFUSING PROPOFOL AT 25MCG/KG/MIN. OGT 
INPLACED, PATENT, INFUSING NEPRO 40ML/HR. FREE WASH WATER 100CC Q4HR. CHEST TUBE DRAINAGE 
20CC COLLECTED OVER THE NIGHT. [facility] EDUARD CATH IN PLACE, CLEAN DRY INTACT. AUSCULTATED S 1 
AND 2. BOWEL SOUNDS ACTIVE, LAST BM 9/6. PT ANURIC. W GENERALIZED WEAKNESS TO BUE BLE. SKIN 
NOT INTACT, SEE WOUND ASSESSMENT. ON STANDARD ISOLATION. SAFETY PRECAUTIONS MET. CALL LIGHT 
WITHIN REACH. INITIAL ASSESSMENT COMPLETE, WILL CONTINUE TO CLOSELY MONITOR.

## 2022-09-07 NOTE — NUR
RECEIVED PATIENT ON BED IN LOW BACK REST POSITION, ALERT AND ORIENTED; BREATHING EVEN AND 
UNLABORED AT 2 LITERS 02/NC, S02 100%. CARDIACSCOPE SHOWS RHYTHM HRJ 85/MIN NO ARRHYTHMIAS 
SEEN. COMMENCING ON IV NS TO KVO VIA G20 ON RIGHT WRIST; PATENT AND INTACT. WITH DIALYSIS 
ACCESS ON RIGHT SUBCLAVIAN; INTACT. ABDOMEN SOFT; ACTIVE BOWEL SOUNDS. 

-------------------------------------------------------------------------------

Addendum: 09/08/22 at 0645 by Yudelka Jones RN

-------------------------------------------------------------------------------

WITH CHEST TUBE TO LEFT LATERAL CHEST TO CONTINOUS LOW SUCTION, DRAINING TO SANGUINOUS 
OUTPUT.

## 2022-09-07 NOTE — NUR
PT. DAUGHTER MADINA CALLED AND PROVIDED UPDATE. SHE STATED SHE WILL COME LISSETH. TO VISIT. WILL 
ENDORSE TO THE NEXT SHIFT.

## 2022-09-07 NOTE — NUR
PT PASS BEDSIDE SWALLOWING TEST WITH APPLE SAUCE. PO MED CRUSHED GIVEN WITH APPLE SAUCE. 
TOLERATE WELL.

## 2022-09-08 VITALS — DIASTOLIC BLOOD PRESSURE: 79 MMHG | SYSTOLIC BLOOD PRESSURE: 154 MMHG

## 2022-09-08 VITALS — DIASTOLIC BLOOD PRESSURE: 72 MMHG | SYSTOLIC BLOOD PRESSURE: 146 MMHG

## 2022-09-08 VITALS — DIASTOLIC BLOOD PRESSURE: 82 MMHG | SYSTOLIC BLOOD PRESSURE: 151 MMHG

## 2022-09-08 VITALS — DIASTOLIC BLOOD PRESSURE: 81 MMHG | SYSTOLIC BLOOD PRESSURE: 157 MMHG

## 2022-09-08 VITALS — SYSTOLIC BLOOD PRESSURE: 149 MMHG | DIASTOLIC BLOOD PRESSURE: 69 MMHG

## 2022-09-08 VITALS — DIASTOLIC BLOOD PRESSURE: 77 MMHG | SYSTOLIC BLOOD PRESSURE: 148 MMHG

## 2022-09-08 VITALS — DIASTOLIC BLOOD PRESSURE: 78 MMHG | SYSTOLIC BLOOD PRESSURE: 152 MMHG

## 2022-09-08 VITALS — SYSTOLIC BLOOD PRESSURE: 151 MMHG | DIASTOLIC BLOOD PRESSURE: 75 MMHG

## 2022-09-08 VITALS — SYSTOLIC BLOOD PRESSURE: 151 MMHG | DIASTOLIC BLOOD PRESSURE: 74 MMHG

## 2022-09-08 VITALS — DIASTOLIC BLOOD PRESSURE: 78 MMHG | SYSTOLIC BLOOD PRESSURE: 157 MMHG

## 2022-09-08 VITALS — DIASTOLIC BLOOD PRESSURE: 81 MMHG | SYSTOLIC BLOOD PRESSURE: 153 MMHG

## 2022-09-08 VITALS — DIASTOLIC BLOOD PRESSURE: 83 MMHG | SYSTOLIC BLOOD PRESSURE: 157 MMHG

## 2022-09-08 VITALS — SYSTOLIC BLOOD PRESSURE: 158 MMHG | DIASTOLIC BLOOD PRESSURE: 75 MMHG

## 2022-09-08 VITALS — DIASTOLIC BLOOD PRESSURE: 77 MMHG | SYSTOLIC BLOOD PRESSURE: 150 MMHG

## 2022-09-08 VITALS — DIASTOLIC BLOOD PRESSURE: 79 MMHG | SYSTOLIC BLOOD PRESSURE: 149 MMHG

## 2022-09-08 VITALS — SYSTOLIC BLOOD PRESSURE: 152 MMHG | DIASTOLIC BLOOD PRESSURE: 75 MMHG

## 2022-09-08 VITALS — DIASTOLIC BLOOD PRESSURE: 72 MMHG | SYSTOLIC BLOOD PRESSURE: 147 MMHG

## 2022-09-08 LAB
ANION GAP SERPL CALCULATED.3IONS-SCNC: 13.9 MMOL/L (ref 8–16)
BASOPHILS # BLD AUTO: 0.2 K/UL (ref 0–0.22)
BASOPHILS NFR BLD AUTO: 1.2 % (ref 0–2)
BUN SERPL-MCNC: 34 MG/DL (ref 7–18)
CHLORIDE SERPL-SCNC: 93 MMOL/L (ref 98–107)
CO2 SERPL-SCNC: 24.8 MMOL/L (ref 21–32)
CREAT SERPL-MCNC: 3.9 MG/DL (ref 0.6–1.3)
EOSINOPHIL # BLD AUTO: 1.2 K/UL (ref 0–0.4)
EOSINOPHIL NFR BLD AUTO: 7.7 % (ref 0–4)
ERYTHROCYTE [DISTWIDTH] IN BLOOD BY AUTOMATED COUNT: 17.9 % (ref 11.6–13.7)
GFR SERPL CREATININE-BSD FRML MDRD: 20 ML/MIN (ref 90–?)
GLUCOSE SERPL-MCNC: 117 MG/DL (ref 74–106)
HCT VFR BLD AUTO: 29.5 % (ref 36–52)
HGB BLD-MCNC: 9.6 G/DL (ref 12–18)
LYMPHOCYTES # BLD AUTO: 1 K/UL (ref 2–11.5)
LYMPHOCYTES NFR BLD AUTO: 7 % (ref 20.5–51.1)
MCH RBC QN AUTO: 30 PG (ref 27–31)
MCHC RBC AUTO-ENTMCNC: 32 G/DL (ref 33–37)
MCV RBC AUTO: 91.9 FL (ref 80–94)
MONOCYTES # BLD AUTO: 1.3 K/UL (ref 0.8–1)
MONOCYTES NFR BLD AUTO: 8.8 % (ref 1.7–9.3)
NEUTROPHILS # BLD AUTO: 11.2 K/UL (ref 1.8–7.7)
NEUTROPHILS NFR BLD AUTO: 75.3 % (ref 42.2–75.2)
PLATELET # BLD AUTO: 313 K/UL (ref 140–450)
POTASSIUM SERPL-SCNC: 4.7 MMOL/L (ref 3.5–5.1)
RBC # BLD AUTO: 3.21 MIL/UL (ref 4.2–6.1)
SODIUM SERPL-SCNC: 127 MMOL/L (ref 136–145)
WBC # BLD AUTO: 14.9 K/UL (ref 4.8–10.8)

## 2022-09-08 PROCEDURE — 5A1D70Z PERFORMANCE OF URINARY FILTRATION, INTERMITTENT, LESS THAN 6 HOURS PER DAY: ICD-10-PCS

## 2022-09-08 RX ADMIN — IPRATROPIUM BROMIDE AND ALBUTEROL SULFATE SCH ML: .5; 3 SOLUTION RESPIRATORY (INHALATION) at 07:00

## 2022-09-08 RX ADMIN — IPRATROPIUM BROMIDE AND ALBUTEROL SULFATE SCH ML: .5; 3 SOLUTION RESPIRATORY (INHALATION) at 19:57

## 2022-09-08 RX ADMIN — IPRATROPIUM BROMIDE AND ALBUTEROL SULFATE SCH ML: .5; 3 SOLUTION RESPIRATORY (INHALATION) at 13:00

## 2022-09-08 RX ADMIN — Medication SCH DEV: at 11:58

## 2022-09-08 RX ADMIN — Medication SCH EA: at 12:05

## 2022-09-08 RX ADMIN — Medication SCH DEV: at 17:49

## 2022-09-08 RX ADMIN — SODIUM CHLORIDE SCH MLS/HR: 9 INJECTION, SOLUTION INTRAVENOUS at 21:00

## 2022-09-08 RX ADMIN — SODIUM CHLORIDE SCH MLS/HR: 9 INJECTION, SOLUTION INTRAVENOUS at 08:50

## 2022-09-08 RX ADMIN — INSULIN LISPRO PRN UNITS: 100 INJECTION, SOLUTION INTRAVENOUS; SUBCUTANEOUS at 17:50

## 2022-09-08 RX ADMIN — Medication SCH DEV: at 00:40

## 2022-09-08 RX ADMIN — Medication SCH TAB: at 08:46

## 2022-09-08 RX ADMIN — SODIUM CHLORIDE PRN MG: 9 INJECTION, SOLUTION INTRAVENOUS at 05:50

## 2022-09-08 RX ADMIN — Medication SCH DEV: at 05:52

## 2022-09-08 RX ADMIN — PANTOPRAZOLE SODIUM SCH MG: 40 INJECTION, POWDER, FOR SOLUTION INTRAVENOUS at 08:46

## 2022-09-08 NOTE — NUR
Pt provided with breakfast tray, able to tolerate swallowing PO meds and mechanical soft 
diet and had 25% of meal.

## 2022-09-08 NOTE — NUR
Closing

Pt states no pain or distress. Pt's daughter at bedside. IV sites intact, patent. Will 
endorse plan of care.

## 2022-09-08 NOTE — NUR
Opening

Received report on pt. Pt AOx4, states no pain or distress on 2L O2 via nasal cannula. IV 
sites on right FA intact, patent, no infiltration noted. Pt anuric, hemodialysis access on 
right subclavian. Left foot/heel dressing intact, no drainage noted. No other complaints at 
this time.

## 2022-09-08 NOTE — NUR
Pt noted with large amount of BM. Perform marcio care, linen change, and dressing change. Pt's 
daughter also at bedside, fed pt soup.

## 2022-09-08 NOTE — NUR
PT TRANSFERRED TO [facility] IN STABLE CONDITION.W/MONITOR ATTACHED.SR NOTED ON MONITOR.LT CHEST 
TUBE IN PLACE.PT ALERT, TALKING.ON 02NC AT 3LPM.NO SOB NOTED.HD CATHETER TO RT IJ INTACT 
W/DRY AND INTACT DRESSING.PERIPHERAL IV TO RT WRIST INTACT,SALINE LOCK.

PTS DAUGHTER AT BEDSIDE.

## 2022-09-08 NOTE — NUR
9/8/22 RD FOLLOW UP COMPLETED



PLEASE REFER TO NUTRITION ASSESSMENT UNDER CARE ACTIVITY FOR ESTIMATED NUTRITIONAL NEEDS. 



1. CONTINUE MECHANICAL SOFT- RENAL DIET AND NEPRO AS TOLERATED.

-RD RECOMMENDS NEPRO BID 

2. RD TO FOLLOW-UP 3-5 DAYS, MODERATE RISK



REVIEWED BY DAMIR HART RD

## 2022-09-08 NOTE — NUR
Dr. Mcdonald rounding on pt, orders made for CT chest without contrast. Dr. Mcdonald states he 
will follow up with chest tube tomorrow for removal.

## 2022-09-09 VITALS — DIASTOLIC BLOOD PRESSURE: 79 MMHG | SYSTOLIC BLOOD PRESSURE: 141 MMHG

## 2022-09-09 VITALS — SYSTOLIC BLOOD PRESSURE: 156 MMHG | DIASTOLIC BLOOD PRESSURE: 71 MMHG

## 2022-09-09 VITALS — DIASTOLIC BLOOD PRESSURE: 90 MMHG | SYSTOLIC BLOOD PRESSURE: 140 MMHG

## 2022-09-09 VITALS — DIASTOLIC BLOOD PRESSURE: 90 MMHG | SYSTOLIC BLOOD PRESSURE: 150 MMHG

## 2022-09-09 VITALS — DIASTOLIC BLOOD PRESSURE: 92 MMHG | SYSTOLIC BLOOD PRESSURE: 144 MMHG

## 2022-09-09 VITALS — DIASTOLIC BLOOD PRESSURE: 72 MMHG | SYSTOLIC BLOOD PRESSURE: 154 MMHG

## 2022-09-09 RX ADMIN — Medication SCH DEV: at 18:51

## 2022-09-09 RX ADMIN — Medication SCH DEV: at 00:00

## 2022-09-09 RX ADMIN — INSULIN LISPRO PRN UNITS: 100 INJECTION, SOLUTION INTRAVENOUS; SUBCUTANEOUS at 11:54

## 2022-09-09 RX ADMIN — IPRATROPIUM BROMIDE AND ALBUTEROL SULFATE SCH ML: .5; 3 SOLUTION RESPIRATORY (INHALATION) at 13:30

## 2022-09-09 RX ADMIN — IPRATROPIUM BROMIDE AND ALBUTEROL SULFATE SCH ML: .5; 3 SOLUTION RESPIRATORY (INHALATION) at 07:30

## 2022-09-09 RX ADMIN — Medication SCH EA: at 13:02

## 2022-09-09 RX ADMIN — Medication SCH TAB: at 10:57

## 2022-09-09 RX ADMIN — SODIUM CHLORIDE SCH MLS/HR: 9 INJECTION, SOLUTION INTRAVENOUS at 10:50

## 2022-09-09 RX ADMIN — Medication SCH DEV: at 11:54

## 2022-09-09 RX ADMIN — EPOETIN ALFA-EPBX SCH UNITS: 10000 INJECTION, SOLUTION INTRAVENOUS; SUBCUTANEOUS at 10:59

## 2022-09-09 RX ADMIN — SODIUM CHLORIDE SCH MLS/HR: 9 INJECTION, SOLUTION INTRAVENOUS at 21:11

## 2022-09-09 RX ADMIN — IPRATROPIUM BROMIDE AND ALBUTEROL SULFATE SCH ML: .5; 3 SOLUTION RESPIRATORY (INHALATION) at 19:27

## 2022-09-09 RX ADMIN — PANTOPRAZOLE SODIUM SCH MG: 40 INJECTION, POWDER, FOR SOLUTION INTRAVENOUS at 10:58

## 2022-09-09 RX ADMIN — Medication SCH DEV: at 06:00

## 2022-09-09 NOTE — NUR
RECEIVED REPORT FROM DAY SHIFT RN FOR CONTINUITY OF CARE. PT IS AWAKE. PT IS AAOX4 Ukrainian 
SPEAKER. PT IS ON NC 3L. PT HAS RIGHT FOREARM 20 GAUGE. AND R IJ. PT HAS WOUND ON LEFT HEEL 
AND SACRAL WOUND. PLAN OF CARE DISCUSSED. CALL LIGHT WITHIN REACH. WILL CONTINUE TO MONITOR 
THE PT.

## 2022-09-09 NOTE — NUR
PT RECEIVED FROM [facility] RT ON 3L NASAL CANNULA, PT SEEN LAYING IN SEMIFOWLERS POSITION IN NO 
RESPIRATORY DISTRESS, WITH A SATURATION OF 97%, PT ALSO TOLERATED TX WELL, WILL CONTINUE TO 
MONITOR.

## 2022-09-09 NOTE — NUR
ROUNDS , NO S/SX OF ACUTE DISTRESS NOTED , O2 SAT WNL , CHEST TUBE DRAINAGE FLUACTUATING 
WELL , ON TELE MONITOR .

## 2022-09-09 NOTE — NUR
ROUNDS , NO COMPLAIN MADE ,. O2 SAT WNL ,  CHEST TUBE DRAINAGE FLUATUATING WELL , ON TELE 
MONITOR , CALL LIGHT WITHIN REACH .

## 2022-09-09 NOTE — NUR
per dr. davis - discharge pt . with chest tube drainage - chest tube drainage c/o long term 
care facility.

## 2022-09-09 NOTE — NUR
PATIENT ALERT SPEAKS English PATIENT IS BLIND FROM BEING DIABETIC. CAME FROM [facility] TO

[id number] HIS DAUGHTER AT BED SIDE  PATIENT HAD DIALYSIS IN [facility] TOOK OUT3 LITERS. NO

VOID YET. PATIENT HAS EDUARD CATH FOR DIALYSIS UPPER RIGHT CHEST. HAS X2 20 GA IN RIGHT 
ARM.PATIENT SINUS ON MONITOR. PATIENT ON 3 LITERS N/C SAT 98%. PATIENT HAS A LEFT CHEST TUBE 
TO LOW INTERMITTENT SUCTION SCANT DRAINAGE IN PLURAL VAC. PATIENT HAS OPEN WOUND ON LEFT 
HEEL MAY REMOVE CHEST TUBE FRIDAY.BLOOD SUGAR MID-NITE 126. NO INSULIN GIVEN.

NO C/O OF PAIN FROM PATIENT BLOOD PRESSURE 156/71 GIVEN APRESOLINE 50MG. DUE AGAIN 0500.

## 2022-09-10 VITALS — DIASTOLIC BLOOD PRESSURE: 75 MMHG | SYSTOLIC BLOOD PRESSURE: 144 MMHG

## 2022-09-10 VITALS — SYSTOLIC BLOOD PRESSURE: 147 MMHG | DIASTOLIC BLOOD PRESSURE: 70 MMHG

## 2022-09-10 VITALS — DIASTOLIC BLOOD PRESSURE: 95 MMHG | SYSTOLIC BLOOD PRESSURE: 145 MMHG

## 2022-09-10 VITALS — DIASTOLIC BLOOD PRESSURE: 71 MMHG | SYSTOLIC BLOOD PRESSURE: 152 MMHG

## 2022-09-10 VITALS — DIASTOLIC BLOOD PRESSURE: 76 MMHG | SYSTOLIC BLOOD PRESSURE: 146 MMHG

## 2022-09-10 VITALS — SYSTOLIC BLOOD PRESSURE: 176 MMHG | DIASTOLIC BLOOD PRESSURE: 69 MMHG

## 2022-09-10 LAB
ANION GAP SERPL CALCULATED.3IONS-SCNC: 14.8 MMOL/L (ref 8–16)
BASOPHILS # BLD AUTO: 0.1 K/UL (ref 0–0.22)
BASOPHILS NFR BLD AUTO: 1.1 % (ref 0–2)
BUN SERPL-MCNC: 38 MG/DL (ref 7–18)
CHLORIDE SERPL-SCNC: 97 MMOL/L (ref 98–107)
CO2 SERPL-SCNC: 23.3 MMOL/L (ref 21–32)
CREAT SERPL-MCNC: 4.7 MG/DL (ref 0.6–1.3)
EOSINOPHIL # BLD AUTO: 0.7 K/UL (ref 0–0.4)
EOSINOPHIL NFR BLD AUTO: 6.2 % (ref 0–4)
ERYTHROCYTE [DISTWIDTH] IN BLOOD BY AUTOMATED COUNT: 18.8 % (ref 11.6–13.7)
GFR SERPL CREATININE-BSD FRML MDRD: 16 ML/MIN (ref 90–?)
GLUCOSE SERPL-MCNC: 111 MG/DL (ref 74–106)
HCT VFR BLD AUTO: 27.9 % (ref 36–52)
HGB BLD-MCNC: 9.1 G/DL (ref 12–18)
LYMPHOCYTES # BLD AUTO: 1.1 K/UL (ref 2–11.5)
LYMPHOCYTES NFR BLD AUTO: 10.1 % (ref 20.5–51.1)
MCH RBC QN AUTO: 30 PG (ref 27–31)
MCHC RBC AUTO-ENTMCNC: 33 G/DL (ref 33–37)
MCV RBC AUTO: 92.6 FL (ref 80–94)
MONOCYTES # BLD AUTO: 1.4 K/UL (ref 0.8–1)
MONOCYTES NFR BLD AUTO: 12.9 % (ref 1.7–9.3)
NEUTROPHILS # BLD AUTO: 7.8 K/UL (ref 1.8–7.7)
NEUTROPHILS NFR BLD AUTO: 69.7 % (ref 42.2–75.2)
PLATELET # BLD AUTO: 317 K/UL (ref 140–450)
POTASSIUM SERPL-SCNC: 5.1 MMOL/L (ref 3.5–5.1)
RBC # BLD AUTO: 3.01 MIL/UL (ref 4.2–6.1)
SODIUM SERPL-SCNC: 130 MMOL/L (ref 136–145)
WBC # BLD AUTO: 11.2 K/UL (ref 4.8–10.8)

## 2022-09-10 PROCEDURE — 5A1D70Z PERFORMANCE OF URINARY FILTRATION, INTERMITTENT, LESS THAN 6 HOURS PER DAY: ICD-10-PCS

## 2022-09-10 RX ADMIN — Medication SCH DEV: at 00:14

## 2022-09-10 RX ADMIN — Medication SCH TAB: at 10:00

## 2022-09-10 RX ADMIN — Medication SCH DEV: at 05:12

## 2022-09-10 RX ADMIN — IPRATROPIUM BROMIDE AND ALBUTEROL SULFATE SCH ML: .5; 3 SOLUTION RESPIRATORY (INHALATION) at 07:59

## 2022-09-10 RX ADMIN — Medication SCH EA: at 14:00

## 2022-09-10 RX ADMIN — Medication SCH DEV: at 18:41

## 2022-09-10 RX ADMIN — PANTOPRAZOLE SODIUM SCH MG: 40 INJECTION, POWDER, FOR SOLUTION INTRAVENOUS at 09:58

## 2022-09-10 RX ADMIN — Medication SCH DEV: at 12:35

## 2022-09-10 RX ADMIN — IPRATROPIUM BROMIDE AND ALBUTEROL SULFATE SCH ML: .5; 3 SOLUTION RESPIRATORY (INHALATION) at 14:16

## 2022-09-10 RX ADMIN — IPRATROPIUM BROMIDE AND ALBUTEROL SULFATE SCH ML: .5; 3 SOLUTION RESPIRATORY (INHALATION) at 20:03

## 2022-09-10 NOTE — NUR
10,000 u heparin - handled by hemo dialysis nurse - for dialysis port flushing . will cont. 
to monitor the pt .

## 2022-09-10 NOTE — NUR
RECEIVED WITH CHEST TUBE TO LEFT SIDE PLEURAL VAC ON AND FUNCTIONING WELL; SUPPLEMENTAL 
OXYGEN AT 3 LPM VIA NC SATURATION 99% POST [facility]N THERAPY TITRATED FIO2 TO 2 LPM JIM/RN 
NOTIFIED

## 2022-09-10 NOTE — NUR
sent msg to dr. davis - pt's daughter is disagreed to put his father to facility . giselle wilkinson 

-------------------------------------------------------------------------------

Addendum: 09/10/22 at 1758 by Gabriella Choe RN

-------------------------------------------------------------------------------

charge nurse ignacia Shore she read my msg i sent to dr. davis .

## 2022-09-10 NOTE — NUR
CNA AT BEDSIDE FOR PATIENT PHYSICAL HYGIENE AND REPOSITION NO DISTRESS NOTED RCP TO ATTEMPT 
[facility] THERAPY AT A LATER TIME

## 2022-09-10 NOTE — NUR
rounds . no s/sx of acute distress noted at this time , chest tube - fluactuating well  on 
tele monitor - sr , o2 sat 99 % , call light within reach .

## 2022-09-10 NOTE — NUR
CHECKED PT BLOOD GLUCOSE. BLOOD GLUCOSE . NO COVERAGE NEEDED. PT DENIES PAIN AND HAS 
NO COMPLAINS. WILL CONTINUE TO MONITOR THE PT.

## 2022-09-10 NOTE — NUR
ENDORSED - PT - STABLE , W/ STILL ON GOING HEMODIALYSIS - DIALYSIS NURSE IS  AT BEDSIDE . I  
ENDORSE TO [place]S  THAT I SENT MSG TO DR. ALBARADO ABOUT THE OBJECTION / DISAGREE OF PT'S 
DAUGHTER MADINA  TO  PUT PT ON FACILITY - TO SUM UP DR. ALBARADO IS AWARE THAT THE PT IS STILL 
HERE IN OUR [facility] - I ENDORSE TO IRIS SHE HAVE TO FF UP TO DR. SARKAR OR SHE HAVE TO 
ENDORSE TO LISSETH GARRETT NURSE ABOUT THE  CONT. OF ABX. WHILE PT IS STILL HERE IN THE HOSPITAL .

## 2022-09-11 VITALS — DIASTOLIC BLOOD PRESSURE: 65 MMHG | SYSTOLIC BLOOD PRESSURE: 145 MMHG

## 2022-09-11 VITALS — DIASTOLIC BLOOD PRESSURE: 75 MMHG | SYSTOLIC BLOOD PRESSURE: 176 MMHG

## 2022-09-11 VITALS — DIASTOLIC BLOOD PRESSURE: 72 MMHG | SYSTOLIC BLOOD PRESSURE: 160 MMHG

## 2022-09-11 VITALS — SYSTOLIC BLOOD PRESSURE: 172 MMHG | DIASTOLIC BLOOD PRESSURE: 70 MMHG

## 2022-09-11 VITALS — SYSTOLIC BLOOD PRESSURE: 170 MMHG | DIASTOLIC BLOOD PRESSURE: 75 MMHG

## 2022-09-11 VITALS — DIASTOLIC BLOOD PRESSURE: 62 MMHG | SYSTOLIC BLOOD PRESSURE: 146 MMHG

## 2022-09-11 RX ADMIN — Medication SCH DEV: at 12:11

## 2022-09-11 RX ADMIN — PANTOPRAZOLE SODIUM SCH MG: 40 INJECTION, POWDER, FOR SOLUTION INTRAVENOUS at 09:34

## 2022-09-11 RX ADMIN — IPRATROPIUM BROMIDE AND ALBUTEROL SULFATE SCH ML: .5; 3 SOLUTION RESPIRATORY (INHALATION) at 19:42

## 2022-09-11 RX ADMIN — Medication SCH EA: at 13:35

## 2022-09-11 RX ADMIN — Medication SCH DEV: at 17:44

## 2022-09-11 RX ADMIN — Medication SCH DEV: at 00:00

## 2022-09-11 RX ADMIN — INSULIN LISPRO PRN UNITS: 100 INJECTION, SOLUTION INTRAVENOUS; SUBCUTANEOUS at 12:11

## 2022-09-11 RX ADMIN — IPRATROPIUM BROMIDE AND ALBUTEROL SULFATE SCH ML: .5; 3 SOLUTION RESPIRATORY (INHALATION) at 14:11

## 2022-09-11 RX ADMIN — Medication SCH DEV: at 06:00

## 2022-09-11 RX ADMIN — IPRATROPIUM BROMIDE AND ALBUTEROL SULFATE SCH ML: .5; 3 SOLUTION RESPIRATORY (INHALATION) at 08:48

## 2022-09-11 RX ADMIN — Medication SCH TAB: at 09:39

## 2022-09-11 NOTE — NUR
RECEIVE ENDORSEMENT FROM PM SHIFT NURSE THAT PATIENT REST IN BED, PIV R. FOREARM SALINE 
LOCK, DIALYSIS TUNNEL/EDUARD CATHETER R. UPPER CHEST, CHEST TUBE PRESENT AT L. LATER ASPECT 
OF CHEST; PRESSURE INJURY AT L. HEEL, AND COCCYX WILL CONTINUE TO MONITOR

## 2022-09-11 NOTE — NUR
ENDORSE PATIENT TO PM SHIFT NURSE WHILE PATIENT REST IN BED, PIV R. FOREARM SALINE LOCK, 
DIALYSIS TUNNEL/EDUARD CATHETER R. UPPER CHEST, CHEST TUBE @L. LATERAL SIDE OF CHEST.

## 2022-09-11 NOTE — NUR
PATIENT AWAKE ALERT LEGAL BLIND FROM DIABETES  LUNGS DIMINISH HAD DIALYSIS 9/10/22 TOOK OUT 
2 LITERS. TEMP 98.9

HAS RT NECK DIALYSIS CATH. PATIENT SINUS ON MONITOR B/P HIGH 176/69. GIVEN APRESOLINE 50 MG 
PO. SO FAR NO VOID

PATIENT HAS A CHEST T-TUBE TO LOW INTERMITTENT SUCTION NO NEW DRAIN. FELY CLAMPS AT BED 
SIDE. HAS OPEN WOUND ON LEFT HEEL. BLOOD SUGAR 0000 139 NO INSULIN GIVEN NO OTHER SIGNS OF 
DISTRESS.

## 2022-09-11 NOTE — NUR
RECEIVED REPORT FROM DAY SHIFT RN FOR CONTINUITY OF CARE. PT IS AWAKE. PT IS AAOX4 Telugu 
SPEAKER. PT IS ON NC 2L. PT HAS RIGHT FOREARM 20 GAUGE. DIALYSIS TUNNEL/EDUARD CATHETER R. 
UPPER CHEST. PT HAS WOUND ON LEFT HEEL AND SACRAL WOUND. PT HAS CHEST TUBE ON LEFT SIDE. 
PLAN OF CARE DISCUSSED. CALL LIGHT WITHIN REACH. WILL CONTINUE TO MONITOR THE PT.

## 2022-09-12 VITALS — SYSTOLIC BLOOD PRESSURE: 152 MMHG | DIASTOLIC BLOOD PRESSURE: 67 MMHG

## 2022-09-12 VITALS — SYSTOLIC BLOOD PRESSURE: 163 MMHG | DIASTOLIC BLOOD PRESSURE: 76 MMHG

## 2022-09-12 VITALS — SYSTOLIC BLOOD PRESSURE: 134 MMHG | DIASTOLIC BLOOD PRESSURE: 65 MMHG

## 2022-09-12 VITALS — DIASTOLIC BLOOD PRESSURE: 68 MMHG | SYSTOLIC BLOOD PRESSURE: 140 MMHG

## 2022-09-12 VITALS — SYSTOLIC BLOOD PRESSURE: 149 MMHG | DIASTOLIC BLOOD PRESSURE: 69 MMHG

## 2022-09-12 VITALS — DIASTOLIC BLOOD PRESSURE: 72 MMHG | SYSTOLIC BLOOD PRESSURE: 150 MMHG

## 2022-09-12 PROCEDURE — 5A1D70Z PERFORMANCE OF URINARY FILTRATION, INTERMITTENT, LESS THAN 6 HOURS PER DAY: ICD-10-PCS

## 2022-09-12 RX ADMIN — Medication SCH TAB: at 08:58

## 2022-09-12 RX ADMIN — Medication SCH DEV: at 05:45

## 2022-09-12 RX ADMIN — INSULIN LISPRO PRN UNITS: 100 INJECTION, SOLUTION INTRAVENOUS; SUBCUTANEOUS at 18:05

## 2022-09-12 RX ADMIN — INSULIN LISPRO PRN UNITS: 100 INJECTION, SOLUTION INTRAVENOUS; SUBCUTANEOUS at 00:14

## 2022-09-12 RX ADMIN — Medication SCH DEV: at 00:14

## 2022-09-12 RX ADMIN — IPRATROPIUM BROMIDE AND ALBUTEROL SULFATE SCH ML: .5; 3 SOLUTION RESPIRATORY (INHALATION) at 06:44

## 2022-09-12 RX ADMIN — SODIUM CHLORIDE PRN MG: 9 INJECTION, SOLUTION INTRAVENOUS at 18:51

## 2022-09-12 RX ADMIN — Medication SCH DEV: at 12:11

## 2022-09-12 RX ADMIN — IPRATROPIUM BROMIDE AND ALBUTEROL SULFATE SCH ML: .5; 3 SOLUTION RESPIRATORY (INHALATION) at 19:02

## 2022-09-12 RX ADMIN — PANTOPRAZOLE SODIUM SCH MG: 40 INJECTION, POWDER, FOR SOLUTION INTRAVENOUS at 08:59

## 2022-09-12 RX ADMIN — IPRATROPIUM BROMIDE AND ALBUTEROL SULFATE SCH ML: .5; 3 SOLUTION RESPIRATORY (INHALATION) at 12:09

## 2022-09-12 RX ADMIN — Medication SCH DEV: at 18:03

## 2022-09-12 RX ADMIN — Medication SCH EA: at 13:59

## 2022-09-12 RX ADMIN — EPOETIN ALFA-EPBX SCH UNITS: 10000 INJECTION, SOLUTION INTRAVENOUS; SUBCUTANEOUS at 09:03

## 2022-09-12 NOTE — NUR
ENDORSE PATIENT TO PM SHIFT NURSE WHILE PATIENT IS REST IN BED, PIV R. FOREARM SALINE LOCK, 
DIALYSIS TUNNEL/EDUARD CATHETER R. UPPER CHEST W/ 2.5 LITER REMOVE FROM TODAY'S DIALYSIS, 
CHEST TUBE PRESENT AT L. LATER ASPECT OF CHEST W/ NO DRAINING THROUGH SUCTION; PRESSURE 
INJURY AT L. HEEL (DRESSING CHANGED), AND COCCYX (DRESSING CHANGED)

## 2022-09-12 NOTE — NUR
RECEIVE ENDORSEMENT FROM PM SHIFT NURSE WHILE PATIENT IS REST IN BED, PIV R. FOREARM SALINE 
LOCK, DIALYSIS TUNNEL/EDUARD CATHETER R. UPPER CHEST, CHEST TUBE PRESENT AT L. LATER ASPECT 
OF CHEST; PRESSURE INJURY AT L. HEEL (DRESSING CHANGED AFTER RECEIVE ENDORSEMENT), AND 
COCCYX. WILL CONTINUE TO MONITOR

## 2022-09-12 NOTE — NUR
RECEIVED ENDORSEMENT FROM DAY SHIFT NURSE FOR CONTINUITY OF PT CARE. PATIENT IS REST IN BED, 
PIV R. FOREARM SALINE LOCK, DIALYSIS TUNNEL/EDUARD CATHETER R. UPPER CHEST, CHEST TUBE 
PRESENT AT L. LATER ASPECT OF CHEST W/ NO DRAINING THROUGH SUCTION; PRESSURE INJURY AT L. 
HEEL COVERED WITH DRY AND CLEAN DRESSING. NO SOB OR DISTRESS. CONTINUE TO MONITOR.

## 2022-09-12 NOTE — NUR
SCHEDULE MEDS GIVE. NO ADVERSE REACTION NOTED. PT DENIES ANY PAIN. NO COMPLAINS. WILL 
CONTINUE TO MONITOR THE PT.

## 2022-09-12 NOTE — NUR
WOUND DRESSING DONE. PT DENIES ANY PAIN AND HE SAYS HE FEELS FINE. NO COMPLAINS AT THIS 
TIME. WILL CONTINUE TO MONITOR THE PT.

## 2022-09-12 NOTE — NUR
OBSERVED PT. PT IS SLEEPING COMFORTABLY IN BED. PT NOT IN ANY ACUTE DISTRESS. VISIBLE CHEST 
RISE AND FALL. CALL LIGHT WITHIN REACH. ALL SAFETY MEASURES TAKEN. WILL CONTINUE TO MONITOR 
THE PT.

## 2022-09-12 NOTE — NUR
***** PHYSICAL THERAPY CO-SIGN *****

The Physical Therapy Progress Notes documented by Physical Therapy Assistant have been 
reviewed.



Reviewed/Co-Signed by: Carri Mesa

Documentation Done by:YENNY PEREZ PTA

-------------------------------------------------------------------------------

Addendum: 09/12/22 at 1537 by Carri Mesa PT

-------------------------------------------------------------------------------

Amended: Links added.

## 2022-09-12 NOTE — NUR
PT OBSERVED. PT IS ASLEEP. PT NOT IN ANY ACUTE DISTRESS. BREATHING EVEN AND UNLABORED. CALL 
LIGHT WITHIN REACH.

## 2022-09-13 VITALS — DIASTOLIC BLOOD PRESSURE: 40 MMHG | SYSTOLIC BLOOD PRESSURE: 155 MMHG

## 2022-09-13 VITALS — DIASTOLIC BLOOD PRESSURE: 67 MMHG | SYSTOLIC BLOOD PRESSURE: 150 MMHG

## 2022-09-13 VITALS — DIASTOLIC BLOOD PRESSURE: 69 MMHG | SYSTOLIC BLOOD PRESSURE: 160 MMHG

## 2022-09-13 VITALS — DIASTOLIC BLOOD PRESSURE: 79 MMHG | SYSTOLIC BLOOD PRESSURE: 154 MMHG

## 2022-09-13 VITALS — DIASTOLIC BLOOD PRESSURE: 69 MMHG | SYSTOLIC BLOOD PRESSURE: 152 MMHG

## 2022-09-13 VITALS — DIASTOLIC BLOOD PRESSURE: 74 MMHG | SYSTOLIC BLOOD PRESSURE: 166 MMHG

## 2022-09-13 RX ADMIN — Medication SCH DEV: at 12:03

## 2022-09-13 RX ADMIN — Medication SCH DEV: at 17:19

## 2022-09-13 RX ADMIN — Medication SCH DEV: at 00:00

## 2022-09-13 RX ADMIN — Medication SCH EA: at 13:28

## 2022-09-13 RX ADMIN — IPRATROPIUM BROMIDE AND ALBUTEROL SULFATE SCH ML: .5; 3 SOLUTION RESPIRATORY (INHALATION) at 19:00

## 2022-09-13 RX ADMIN — INSULIN LISPRO PRN UNITS: 100 INJECTION, SOLUTION INTRAVENOUS; SUBCUTANEOUS at 12:06

## 2022-09-13 RX ADMIN — Medication SCH DEV: at 06:00

## 2022-09-13 RX ADMIN — IPRATROPIUM BROMIDE AND ALBUTEROL SULFATE SCH ML: .5; 3 SOLUTION RESPIRATORY (INHALATION) at 07:00

## 2022-09-13 RX ADMIN — Medication SCH TAB: at 08:24

## 2022-09-13 RX ADMIN — INSULIN LISPRO PRN UNITS: 100 INJECTION, SOLUTION INTRAVENOUS; SUBCUTANEOUS at 17:21

## 2022-09-13 RX ADMIN — IPRATROPIUM BROMIDE AND ALBUTEROL SULFATE SCH ML: .5; 3 SOLUTION RESPIRATORY (INHALATION) at 12:49

## 2022-09-13 RX ADMIN — INSULIN LISPRO PRN UNITS: 100 INJECTION, SOLUTION INTRAVENOUS; SUBCUTANEOUS at 01:10

## 2022-09-13 RX ADMIN — PANTOPRAZOLE SODIUM SCH MG: 40 INJECTION, POWDER, FOR SOLUTION INTRAVENOUS at 08:23

## 2022-09-13 NOTE — NUR
PERSONAL HYGIENE RENDERED, CHANGE DRESSING ON COCCYX AREA. COCCYX WOUND CLOSE, CLEAN AND 
DRY. NO COMPLAINT OF PAIN.

## 2022-09-13 NOTE — NUR
ENDORSE PATIENT TO PM SHIFT NURSE WHILE PATIENT IS REST IN BED, PIV R. FOREARM SALINE LOCK, 
DIALYSIS TUNNEL/EDUARD CATHETER R. UPPER CHEST, CHEST TUBE PRESENT AT L. LATER ASPECT OF 
CHEST CLAMPED; PRESSURE INJURY AT L. HEEL AND COCCYX (DRESSING CHANGED)

## 2022-09-13 NOTE — NUR
CHEST CT DONE TO VERIFY PATENCY OF CHEST TUBE  & IJ EDUARD CATHETER PLACEMENT, PLEURAL 
EFFUSION STATUS.  PATIENT'S CHEST TUBE CLAMPED AT THIS TIME.  WILL CONTINUE TO MONITOR.

## 2022-09-13 NOTE — NUR
9/13/22 RD FOLLOW UP COMPLETED



PLEASE REFER TO NUTRITION ASSESSMENT UNDER CARE ACTIVITY FOR ESTIMATED NUTRITIONAL NEEDS. 



1. CONTINUE MECHANICAL SOFT- RENAL DIET AS TOLERATED

-RECOMMEND NEPRO AND PROSOURCE BID PER RX PROTOCOL

2. RD TO FOLLOW-UP 3-5 DAYS, MODERATE RISK



REVIEWED BY DAMIR HART RD

## 2022-09-13 NOTE — NUR
PT IS ASLEEP.  NO SOB OR DISTRESS.  PT DENIED OF PAIN. ENDORSED TO DAY SHIFT NURSE FOR 
CONTINUITY OF CARE.

## 2022-09-13 NOTE — NUR
RECEIVED ENDORSEMENT FROM DAY SHIFT NURSE FOR CONTINUITY OF PT CARE. PATIENT IS ON BED, 
AWAKE & ALERT AND ON STABLE CONDITION, PIV R. FOREARM SALINE LOCK INTACT, DIALYSIS 
TUNNEL/EDUARD CATHETER R. UPPER CHEST, CHEST TUBE PRESENT AT L. LATER ASPECT OF CHEST W/ NO 
DRAINING THROUGH SUCTION; PRESSURE INJURY AT L. HEEL COVERED WITH DRY AND CLEAN DRESSING. NO 
SOB OR DISTRESS. CONTINUE TO MONITOR.

## 2022-09-14 VITALS — SYSTOLIC BLOOD PRESSURE: 162 MMHG | DIASTOLIC BLOOD PRESSURE: 75 MMHG

## 2022-09-14 VITALS — SYSTOLIC BLOOD PRESSURE: 156 MMHG | DIASTOLIC BLOOD PRESSURE: 79 MMHG

## 2022-09-14 VITALS — DIASTOLIC BLOOD PRESSURE: 70 MMHG | SYSTOLIC BLOOD PRESSURE: 152 MMHG

## 2022-09-14 VITALS — SYSTOLIC BLOOD PRESSURE: 149 MMHG | DIASTOLIC BLOOD PRESSURE: 70 MMHG

## 2022-09-14 PROCEDURE — 5A1D70Z PERFORMANCE OF URINARY FILTRATION, INTERMITTENT, LESS THAN 6 HOURS PER DAY: ICD-10-PCS

## 2022-09-14 RX ADMIN — Medication SCH DEV: at 05:26

## 2022-09-14 RX ADMIN — Medication SCH DEV: at 00:00

## 2022-09-14 RX ADMIN — Medication SCH EA: at 12:56

## 2022-09-14 RX ADMIN — Medication SCH DEV: at 12:16

## 2022-09-14 RX ADMIN — IPRATROPIUM BROMIDE AND ALBUTEROL SULFATE SCH ML: .5; 3 SOLUTION RESPIRATORY (INHALATION) at 13:30

## 2022-09-14 RX ADMIN — PANTOPRAZOLE SODIUM SCH MG: 40 INJECTION, POWDER, FOR SOLUTION INTRAVENOUS at 08:28

## 2022-09-14 RX ADMIN — Medication SCH DEV: at 18:14

## 2022-09-14 RX ADMIN — IPRATROPIUM BROMIDE AND ALBUTEROL SULFATE SCH ML: .5; 3 SOLUTION RESPIRATORY (INHALATION) at 19:21

## 2022-09-14 RX ADMIN — IPRATROPIUM BROMIDE AND ALBUTEROL SULFATE SCH ML: .5; 3 SOLUTION RESPIRATORY (INHALATION) at 07:30

## 2022-09-14 RX ADMIN — EPOETIN ALFA-EPBX SCH UNITS: 10000 INJECTION, SOLUTION INTRAVENOUS; SUBCUTANEOUS at 08:29

## 2022-09-14 RX ADMIN — INSULIN LISPRO PRN UNITS: 100 INJECTION, SOLUTION INTRAVENOUS; SUBCUTANEOUS at 18:15

## 2022-09-14 RX ADMIN — Medication SCH TAB: at 08:28

## 2022-09-14 NOTE — NUR
PT RECEIVED FROM [facility] RT, PT IS SEEN IN NO RESPIRATORY DISTRESS COMMUNICATING IN [place], 
STATING HE COUGHS UP PHLEGM, PT TOLERATED TREATMENT WELL. PT IS ON ROOM AIR SATURATING WELL 
WITH CHEST TUBE ON LEFT SIDE CONNECTED TO WALL SUCTION, AMBU BAG AT BEDSIDE AND BED WHEELS 
LOCKED. WILL CONTINUE TO MONITOR PT.

## 2022-09-14 NOTE — NUR
RECEIVED PT ENDORSEMENT FROM DAY SHIFT NURSE FOR CONTINUITY OF PT CARE. PATIENT IS ON BED, 
AWAKE & ALERT AND ON STABLE CONDITION, PIV R. FOREARM SALINE LOCK INTACT, DIALYSIS 
TUNNEL/EDUARD CATHETER R. UPPER CHEST, CHEST TUBE PRESENT AT L. LATER ASPECT OF CHEST W/ NO 
DRAINAGE THROUGH SUCTION; PRESSURE INJURY AT L. HEEL COVERED WITH DRY AND CLEAN DRESSING. NO 
SOB OR DISTRESS. CONTINUE TO MONITOR.

## 2022-09-14 NOTE — NUR
PT IS ON STABLE CONDITION. IV SALINE LOCK ON RIGHT FOREARM 20G INTACT AND PATENT. LEFT HEEL 
ULCER IS COVERED WITH CLEAN AND DRY DRESSING. PT DENIES OF PAIN/DISCOMFORT AT THIS TIME. 
CHEST TUBE ON LEFT CHEST INTACT AND PATENT AND DRY. IJ EDUARD ON RIGHT UPPER CHEST INTACT 
AND DRY. DENIES OF PAIN/CHEST PAIN, NAUSEA OR VOMITING. NO CHANGE OF CONDITION. ALL SAFETY 
MEASURES ARE IN PLACE. ENDORSE TO DAY SHIFT NURSE FOR CONTINUITY OF CARE.

## 2022-09-14 NOTE — NUR
BLOOD SUGAR CHECK = 119. NO SLIDING SCALE COVERAGE. PT IS STABLE, DENIED OF PAIN OR 
DISCOMFORT. PT ABLE TO SWALLOW WHOLE PILL OF MEDS WITH NO PROBLEM. PT DENIED OF NAUSEA OR 
VOMITING.

## 2022-09-14 NOTE — NUR
RECEIVED REPORT FROM night SHIFT RN FOR CONTINUITY OF CARE. PT IS AWAKE. PT IS AAOX4 [place] 
SPEAKER. PT IS ON RA, NO RESPIRATORY DISTRESS. PT HAS RIGHT FOREARM 20 GAUGE. DIALYSIS 
TUNNEL CATHETER R UPPER CHEST. PT HAS WOUND ON LEFT HEEL AND SACRAL WOUND. PT HAS CHEST TUBE 
ON LEFT SIDE. PLAN OF CARE DISCUSSED. CALL LIGHT WITHIN REACH. WILL CONTINUE TO MONITOR THE 
PT.

## 2022-09-15 VITALS — SYSTOLIC BLOOD PRESSURE: 152 MMHG | DIASTOLIC BLOOD PRESSURE: 70 MMHG

## 2022-09-15 VITALS — SYSTOLIC BLOOD PRESSURE: 122 MMHG | DIASTOLIC BLOOD PRESSURE: 61 MMHG

## 2022-09-15 VITALS — DIASTOLIC BLOOD PRESSURE: 72 MMHG | SYSTOLIC BLOOD PRESSURE: 165 MMHG

## 2022-09-15 RX ADMIN — Medication SCH DEV: at 16:51

## 2022-09-15 RX ADMIN — PANTOPRAZOLE SODIUM SCH MG: 40 INJECTION, POWDER, FOR SOLUTION INTRAVENOUS at 09:00

## 2022-09-15 RX ADMIN — Medication SCH DEV: at 06:03

## 2022-09-15 RX ADMIN — Medication SCH EA: at 13:41

## 2022-09-15 RX ADMIN — IPRATROPIUM BROMIDE AND ALBUTEROL SULFATE SCH ML: .5; 3 SOLUTION RESPIRATORY (INHALATION) at 13:43

## 2022-09-15 RX ADMIN — Medication SCH DEV: at 13:43

## 2022-09-15 RX ADMIN — Medication SCH TAB: at 09:00

## 2022-09-15 RX ADMIN — INSULIN LISPRO PRN UNITS: 100 INJECTION, SOLUTION INTRAVENOUS; SUBCUTANEOUS at 16:52

## 2022-09-15 RX ADMIN — IPRATROPIUM BROMIDE AND ALBUTEROL SULFATE SCH ML: .5; 3 SOLUTION RESPIRATORY (INHALATION) at 19:29

## 2022-09-15 RX ADMIN — INSULIN LISPRO PRN UNITS: 100 INJECTION, SOLUTION INTRAVENOUS; SUBCUTANEOUS at 01:52

## 2022-09-15 RX ADMIN — IPRATROPIUM BROMIDE AND ALBUTEROL SULFATE SCH ML: .5; 3 SOLUTION RESPIRATORY (INHALATION) at 07:30

## 2022-09-15 RX ADMIN — Medication SCH DEV: at 01:52

## 2022-09-15 NOTE — NUR
RECEIVED PATIENT FROM AM SHIFT NURSE. PATIENT RESTING COMFORTABLY IN BED. ON ROOM AIR. NO 
SOB NOTED. BREATHING REGULAR NON LABORED. IV ACCESS ON THE RIGHT ARM SALINE LOCK. EDUARD 
CATHETER ON THE RIGHT UPPER CHEST DIALYSIS ACCESS. ALL SAFETY PRECAUTIONS ARE IN PLACE. 
WHEELS OF BED LOCK. DENIES PAIN. CALL LIGHT WITHIN REACH. WILL CONTINUE TO MONITOR.

## 2022-09-15 NOTE — NUR
PT RECEIVED FROM NOC RT, PT SEEN IN SEMIFOLWERS IN BED RESTING AND ALERT, PT TOLERATED TX 
WELL, AND SATING WELL. WILL CONTINUE TO MONITOR.

## 2022-09-16 VITALS — SYSTOLIC BLOOD PRESSURE: 141 MMHG | DIASTOLIC BLOOD PRESSURE: 77 MMHG

## 2022-09-16 VITALS — SYSTOLIC BLOOD PRESSURE: 172 MMHG | DIASTOLIC BLOOD PRESSURE: 75 MMHG

## 2022-09-16 VITALS — DIASTOLIC BLOOD PRESSURE: 78 MMHG | SYSTOLIC BLOOD PRESSURE: 149 MMHG

## 2022-09-16 LAB
ANION GAP SERPL CALCULATED.3IONS-SCNC: 15.8 MMOL/L (ref 8–16)
BASOPHILS # BLD AUTO: 0.1 K/UL (ref 0–0.22)
BASOPHILS NFR BLD AUTO: 1.2 % (ref 0–2)
BUN SERPL-MCNC: 42 MG/DL (ref 7–18)
CHLORIDE SERPL-SCNC: 96 MMOL/L (ref 98–107)
CO2 SERPL-SCNC: 27 MMOL/L (ref 21–32)
CREAT SERPL-MCNC: 4.3 MG/DL (ref 0.6–1.3)
EOSINOPHIL # BLD AUTO: 0.4 K/UL (ref 0–0.4)
EOSINOPHIL NFR BLD AUTO: 3.7 % (ref 0–4)
ERYTHROCYTE [DISTWIDTH] IN BLOOD BY AUTOMATED COUNT: 17.1 % (ref 11.6–13.7)
GFR SERPL CREATININE-BSD FRML MDRD: 18 ML/MIN (ref 90–?)
GLUCOSE SERPL-MCNC: 154 MG/DL (ref 74–106)
HCT VFR BLD AUTO: 30.3 % (ref 36–52)
HGB BLD-MCNC: 10.1 G/DL (ref 12–18)
LYMPHOCYTES # BLD AUTO: 1 K/UL (ref 2–11.5)
LYMPHOCYTES NFR BLD AUTO: 10.1 % (ref 20.5–51.1)
MCH RBC QN AUTO: 31 PG (ref 27–31)
MCHC RBC AUTO-ENTMCNC: 33 G/DL (ref 33–37)
MCV RBC AUTO: 92.2 FL (ref 80–94)
MONOCYTES # BLD AUTO: 0.8 K/UL (ref 0.8–1)
MONOCYTES NFR BLD AUTO: 8.2 % (ref 1.7–9.3)
NEUTROPHILS # BLD AUTO: 7.9 K/UL (ref 1.8–7.7)
NEUTROPHILS NFR BLD AUTO: 76.8 % (ref 42.2–75.2)
PLATELET # BLD AUTO: 274 K/UL (ref 140–450)
POTASSIUM SERPL-SCNC: 3.8 MMOL/L (ref 3.5–5.1)
RBC # BLD AUTO: 3.29 MIL/UL (ref 4.2–6.1)
SODIUM SERPL-SCNC: 135 MMOL/L (ref 136–145)
WBC # BLD AUTO: 10.3 K/UL (ref 4.8–10.8)

## 2022-09-16 PROCEDURE — 5A1D70Z PERFORMANCE OF URINARY FILTRATION, INTERMITTENT, LESS THAN 6 HOURS PER DAY: ICD-10-PCS

## 2022-09-16 RX ADMIN — Medication SCH DEV: at 05:30

## 2022-09-16 RX ADMIN — Medication SCH TAB: at 10:19

## 2022-09-16 RX ADMIN — IPRATROPIUM BROMIDE AND ALBUTEROL SULFATE SCH ML: .5; 3 SOLUTION RESPIRATORY (INHALATION) at 13:47

## 2022-09-16 RX ADMIN — EPOETIN ALFA-EPBX SCH UNITS: 10000 INJECTION, SOLUTION INTRAVENOUS; SUBCUTANEOUS at 10:19

## 2022-09-16 RX ADMIN — INSULIN LISPRO PRN UNITS: 100 INJECTION, SOLUTION INTRAVENOUS; SUBCUTANEOUS at 00:30

## 2022-09-16 RX ADMIN — INSULIN LISPRO PRN UNITS: 100 INJECTION, SOLUTION INTRAVENOUS; SUBCUTANEOUS at 13:17

## 2022-09-16 RX ADMIN — IPRATROPIUM BROMIDE AND ALBUTEROL SULFATE SCH ML: .5; 3 SOLUTION RESPIRATORY (INHALATION) at 07:14

## 2022-09-16 RX ADMIN — Medication SCH EA: at 13:17

## 2022-09-16 RX ADMIN — ACETAMINOPHEN PRN MG: 325 TABLET ORAL at 16:40

## 2022-09-16 RX ADMIN — Medication SCH DEV: at 00:28

## 2022-09-16 RX ADMIN — Medication SCH DEV: at 12:00

## 2022-09-16 RX ADMIN — PANTOPRAZOLE SODIUM SCH MG: 40 INJECTION, POWDER, FOR SOLUTION INTRAVENOUS at 10:18

## 2022-09-16 NOTE — NUR
TEMPERATURE 100.4 NOTED. PRN ADMINISTERED PER MD ORDER AND ICE PACK APPLIED. FAMILY AT 
BEDSIDE WAITING TO DC PT

## 2022-09-16 NOTE — NUR
PT DC HOME WITH FAMILY IN PRIVATE VEHICLE. ALL SAFETY MEASURES IN PLACE. IV REMOVED, CANULA 
INTACT. ALL PERSONAL BELONGINGS IN POSSESSION

## 2022-09-16 NOTE — NUR
WOUND CARE RE-EVALUATION NOTE:

PT. IS AWAKE ALERT, ABLE TO TURN AND REPOSITION WITH ONE PERSON ASSIST, DENY OF PAIN. POC 
DISCUSSED WITH PRIMARY RN. S/P CHEST TUBE REMOVAL YESTERDAY. NO COMPLICATIONS. WILL CONTINUE 
WOUND CARE INTERVENTIONS AS ORDERED.

-INCONTINENT ASSOCIATE DERMATITIS (IAD) RESOLVED

-PRESSURE INJURY STAGE 3, SACROCOCCYX CONTINUE TO RESURFACING 3X2CM, SUPERFICIAL DEPTH WOUND 
BED % PINK TISSUE, MOIST, NO ODOR, SERENA-WOUND SKIN DRY AND INTACT

-PRESSURE INJURY UN-STAGEABLE, LEFT HEEL 5X5.5CM WOUND BED IS RED 10% LIGHT BROWN SLOUGH 
TISSUE, 90% RED GRANULATING TISSUE SMALL AMOUNT SEROUS DRAINAGE, NO ODOR, SERENA WOUND SKIN 
MUSHY AND INTACT

## 2022-09-16 NOTE — NUR
REASSESSED TEMP AFTER INTERVENTIONS. TEMP 98.3

-------------------------------------------------------------------------------

Addendum: 09/16/22 at 1748 by Zackary Rebolledo RN RN

-------------------------------------------------------------------------------

BP ELEVATED 172/75, WILL ADMINISTER ANTIHYPERTENSIVE PER MD MARTEL

## 2022-09-16 NOTE — NUR
DIALYSIS NURSE AND FAMILY AT BEDSIDE. PT TO BE DISCHARGED AFTER HD COMPLETE AND STABLE. ALL 
SAFETY MEASURES IN PLACE
